# Patient Record
Sex: FEMALE | Race: WHITE | NOT HISPANIC OR LATINO | Employment: FULL TIME | URBAN - METROPOLITAN AREA
[De-identification: names, ages, dates, MRNs, and addresses within clinical notes are randomized per-mention and may not be internally consistent; named-entity substitution may affect disease eponyms.]

---

## 2017-08-02 ENCOUNTER — HISTORICAL (OUTPATIENT)
Dept: LAB | Facility: HOSPITAL | Age: 27
End: 2017-08-02

## 2017-11-01 ENCOUNTER — HISTORICAL (OUTPATIENT)
Dept: LAB | Facility: HOSPITAL | Age: 27
End: 2017-11-01

## 2017-11-01 LAB
ABS NEUT (OLG): 3.62 X10(3)/MCL (ref 2.1–9.2)
ALBUMIN SERPL-MCNC: 4.3 GM/DL (ref 3.4–5)
ALBUMIN/GLOB SERPL: 1.1 RATIO (ref 1.1–2)
ALP SERPL-CCNC: 69 UNIT/L (ref 38–126)
ALT SERPL-CCNC: 23 UNIT/L (ref 12–78)
AST SERPL-CCNC: 14 UNIT/L (ref 15–37)
BASOPHILS # BLD AUTO: 0 X10(3)/MCL (ref 0–0.2)
BASOPHILS NFR BLD AUTO: 0 %
BILIRUB SERPL-MCNC: 0.9 MG/DL (ref 0.2–1)
BILIRUBIN DIRECT+TOT PNL SERPL-MCNC: 0.2 MG/DL (ref 0–0.5)
BILIRUBIN DIRECT+TOT PNL SERPL-MCNC: 0.7 MG/DL (ref 0–0.8)
BUN SERPL-MCNC: 10 MG/DL (ref 7–18)
CALCIUM SERPL-MCNC: 9.4 MG/DL (ref 8.5–10.1)
CHLORIDE SERPL-SCNC: 105 MMOL/L (ref 98–107)
CHOLEST SERPL-MCNC: 139 MG/DL (ref 0–200)
CHOLEST/HDLC SERPL: 2.2 {RATIO} (ref 0–4)
CO2 SERPL-SCNC: 26 MMOL/L (ref 21–32)
CREAT SERPL-MCNC: 0.6 MG/DL (ref 0.55–1.02)
DEPRECATED CALCIDIOL+CALCIFEROL SERPL-MC: 30.35 NG/ML (ref 30–80)
EOSINOPHIL # BLD AUTO: 0.1 X10(3)/MCL (ref 0–0.9)
EOSINOPHIL NFR BLD AUTO: 1 %
ERYTHROCYTE [DISTWIDTH] IN BLOOD BY AUTOMATED COUNT: 12.1 % (ref 11.5–17)
GLOBULIN SER-MCNC: 4 GM/DL (ref 2.4–3.5)
GLUCOSE SERPL-MCNC: 92 MG/DL (ref 74–106)
HCT VFR BLD AUTO: 42.2 % (ref 37–47)
HDLC SERPL-MCNC: 62 MG/DL (ref 35–60)
HGB BLD-MCNC: 14.1 GM/DL (ref 12–16)
LDLC SERPL CALC-MCNC: 70 MG/DL (ref 0–129)
LYMPHOCYTES # BLD AUTO: 2.7 X10(3)/MCL (ref 0.6–4.6)
LYMPHOCYTES NFR BLD AUTO: 39 %
MCH RBC QN AUTO: 29.6 PG (ref 27–31)
MCHC RBC AUTO-ENTMCNC: 33.4 GM/DL (ref 33–36)
MCV RBC AUTO: 88.5 FL (ref 80–94)
MONOCYTES # BLD AUTO: 0.6 X10(3)/MCL (ref 0.1–1.3)
MONOCYTES NFR BLD AUTO: 8 %
NEUTROPHILS # BLD AUTO: 3.62 X10(3)/MCL (ref 2.1–9.2)
NEUTROPHILS NFR BLD AUTO: 52 %
PLATELET # BLD AUTO: 247 X10(3)/MCL (ref 130–400)
PMV BLD AUTO: 10.4 FL (ref 9.4–12.4)
POTASSIUM SERPL-SCNC: 4.3 MMOL/L (ref 3.5–5.1)
PROT SERPL-MCNC: 8.3 GM/DL (ref 6.4–8.2)
RBC # BLD AUTO: 4.77 X10(6)/MCL (ref 4.2–5.4)
SODIUM SERPL-SCNC: 140 MMOL/L (ref 136–145)
TRIGL SERPL-MCNC: 35 MG/DL (ref 30–150)
VLDLC SERPL CALC-MCNC: 7 MG/DL
WBC # SPEC AUTO: 7 X10(3)/MCL (ref 4.5–11.5)

## 2017-11-21 LAB — RAPID GROUP A STREP (OHS): POSITIVE

## 2018-01-15 LAB
INFLUENZA A ANTIGEN, POC: NEGATIVE
INFLUENZA B ANTIGEN, POC: NEGATIVE
RAPID GROUP A STREP (OHS): NEGATIVE

## 2018-02-16 ENCOUNTER — HISTORICAL (OUTPATIENT)
Dept: LAB | Facility: HOSPITAL | Age: 28
End: 2018-02-16

## 2018-02-16 LAB
ABS NEUT (OLG): 4.34 X10(3)/MCL (ref 2.1–9.2)
BASOPHILS # BLD AUTO: 0.03 X10(3)/MCL (ref 0–0.2)
BASOPHILS NFR BLD AUTO: 0.4 % (ref 0–1)
DEPRECATED CALCIDIOL+CALCIFEROL SERPL-MC: 24.24 NG/ML (ref 30–80)
EOSINOPHIL # BLD AUTO: 0.11 X10(3)/MCL (ref 0–0.9)
EOSINOPHIL NFR BLD AUTO: 1.4 % (ref 0–6.4)
ERYTHROCYTE [DISTWIDTH] IN BLOOD BY AUTOMATED COUNT: 12.2 % (ref 11.5–17)
EST. AVERAGE GLUCOSE BLD GHB EST-MCNC: 171 MG/DL
FOLATE SERPL-MCNC: 25.2 NG/ML (ref 3.1–17.5)
HBA1C MFR BLD: 7.6 % (ref 4.2–6.3)
HCT VFR BLD AUTO: 41.3 % (ref 37–47)
HGB BLD-MCNC: 14 GM/DL (ref 12–16)
IMM GRANULOCYTES # BLD AUTO: 0.01 10*3/UL (ref 0–0.02)
IMM GRANULOCYTES NFR BLD AUTO: 0.1 % (ref 0–0.43)
LYMPHOCYTES # BLD AUTO: 2.66 X10(3)/MCL (ref 0.6–4.6)
LYMPHOCYTES NFR BLD AUTO: 34.9 % (ref 16–44)
MCH RBC QN AUTO: 30 PG (ref 27–31)
MCHC RBC AUTO-ENTMCNC: 33.9 GM/DL (ref 33–36)
MCV RBC AUTO: 88.6 FL (ref 80–94)
MONOCYTES # BLD AUTO: 0.47 X10(3)/MCL (ref 0.1–1.3)
MONOCYTES NFR BLD AUTO: 6.2 % (ref 4–12.1)
NEUTROPHILS # BLD AUTO: 4.34 X10(3)/MCL (ref 2.1–9.2)
NEUTROPHILS NFR BLD AUTO: 57 % (ref 43–73)
NRBC BLD AUTO-RTO: 0 % (ref 0–0.2)
PLATELET # BLD AUTO: 227 X10(3)/MCL (ref 130–400)
PMV BLD AUTO: 9.5 FL (ref 7.4–10.4)
RBC # BLD AUTO: 4.66 X10(6)/MCL (ref 4.2–5.4)
T4 FREE SERPL-MCNC: 0.97 NG/DL (ref 0.76–1.46)
TSH SERPL-ACNC: 1.45 MIU/ML (ref 0.36–3.74)
VIT B12 SERPL-MCNC: 745 PG/ML (ref 193–986)
WBC # SPEC AUTO: 7.6 X10(3)/MCL (ref 4.5–11.5)

## 2018-10-29 LAB — POC BETA-HCG (QUAL): NEGATIVE

## 2020-10-14 LAB — POC BETA-HCG (QUAL): NEGATIVE

## 2022-04-10 ENCOUNTER — HISTORICAL (OUTPATIENT)
Dept: ADMINISTRATIVE | Facility: HOSPITAL | Age: 32
End: 2022-04-10

## 2022-04-27 VITALS
HEIGHT: 64 IN | OXYGEN SATURATION: 99 % | DIASTOLIC BLOOD PRESSURE: 72 MMHG | WEIGHT: 148 LBS | BODY MASS INDEX: 25.27 KG/M2 | SYSTOLIC BLOOD PRESSURE: 128 MMHG

## 2022-07-14 ENCOUNTER — OFFICE VISIT (OUTPATIENT)
Dept: URGENT CARE | Facility: CLINIC | Age: 32
End: 2022-07-14
Payer: COMMERCIAL

## 2022-07-14 VITALS
HEIGHT: 67 IN | OXYGEN SATURATION: 98 % | BODY MASS INDEX: 23.54 KG/M2 | WEIGHT: 150 LBS | HEART RATE: 94 BPM | TEMPERATURE: 99 F | DIASTOLIC BLOOD PRESSURE: 91 MMHG | SYSTOLIC BLOOD PRESSURE: 142 MMHG | RESPIRATION RATE: 17 BRPM

## 2022-07-14 DIAGNOSIS — R05.9 COUGH: ICD-10-CM

## 2022-07-14 DIAGNOSIS — U07.1 COVID-19: Primary | ICD-10-CM

## 2022-07-14 LAB
CTP QC/QA: YES
SARS-COV-2 RDRP RESP QL NAA+PROBE: POSITIVE

## 2022-07-14 PROCEDURE — 1160F PR REVIEW ALL MEDS BY PRESCRIBER/CLIN PHARMACIST DOCUMENTED: ICD-10-PCS | Mod: CPTII,,, | Performed by: FAMILY MEDICINE

## 2022-07-14 PROCEDURE — 99213 OFFICE O/P EST LOW 20 MIN: CPT | Mod: ,,, | Performed by: FAMILY MEDICINE

## 2022-07-14 PROCEDURE — 1160F RVW MEDS BY RX/DR IN RCRD: CPT | Mod: CPTII,,, | Performed by: FAMILY MEDICINE

## 2022-07-14 PROCEDURE — U0002 COVID-19 LAB TEST NON-CDC: HCPCS | Mod: QW,,, | Performed by: FAMILY MEDICINE

## 2022-07-14 PROCEDURE — 3008F PR BODY MASS INDEX (BMI) DOCUMENTED: ICD-10-PCS | Mod: CPTII,,, | Performed by: FAMILY MEDICINE

## 2022-07-14 PROCEDURE — 3077F PR MOST RECENT SYSTOLIC BLOOD PRESSURE >= 140 MM HG: ICD-10-PCS | Mod: CPTII,,, | Performed by: FAMILY MEDICINE

## 2022-07-14 PROCEDURE — 3080F DIAST BP >= 90 MM HG: CPT | Mod: CPTII,,, | Performed by: FAMILY MEDICINE

## 2022-07-14 PROCEDURE — 3008F BODY MASS INDEX DOCD: CPT | Mod: CPTII,,, | Performed by: FAMILY MEDICINE

## 2022-07-14 PROCEDURE — 3077F SYST BP >= 140 MM HG: CPT | Mod: CPTII,,, | Performed by: FAMILY MEDICINE

## 2022-07-14 PROCEDURE — 3080F PR MOST RECENT DIASTOLIC BLOOD PRESSURE >= 90 MM HG: ICD-10-PCS | Mod: CPTII,,, | Performed by: FAMILY MEDICINE

## 2022-07-14 PROCEDURE — 1159F PR MEDICATION LIST DOCUMENTED IN MEDICAL RECORD: ICD-10-PCS | Mod: CPTII,,, | Performed by: FAMILY MEDICINE

## 2022-07-14 PROCEDURE — 99213 PR OFFICE/OUTPT VISIT, EST, LEVL III, 20-29 MIN: ICD-10-PCS | Mod: ,,, | Performed by: FAMILY MEDICINE

## 2022-07-14 PROCEDURE — U0002: ICD-10-PCS | Mod: QW,,, | Performed by: FAMILY MEDICINE

## 2022-07-14 PROCEDURE — 1159F MED LIST DOCD IN RCRD: CPT | Mod: CPTII,,, | Performed by: FAMILY MEDICINE

## 2022-07-14 RX ORDER — FLUTICASONE PROPIONATE 50 MCG
1 SPRAY, SUSPENSION (ML) NASAL DAILY
COMMUNITY
Start: 2022-01-19

## 2022-07-14 RX ORDER — CLINDAMYCIN HYDROCHLORIDE 300 MG/1
CAPSULE ORAL
Status: ON HOLD | COMMUNITY
Start: 2022-07-05 | End: 2023-09-22 | Stop reason: HOSPADM

## 2022-07-14 RX ORDER — CLOMIPHENE CITRATE 50 MG/1
TABLET ORAL
Status: ON HOLD | COMMUNITY
Start: 2022-06-20 | End: 2023-09-22 | Stop reason: HOSPADM

## 2022-07-14 RX ORDER — INSULIN GLARGINE 100 [IU]/ML
16 INJECTION, SOLUTION SUBCUTANEOUS
Status: ON HOLD | COMMUNITY
Start: 2021-12-22 | End: 2023-09-22 | Stop reason: HOSPADM

## 2022-07-14 RX ORDER — HYDROXYZINE PAMOATE 25 MG/1
CAPSULE ORAL ONCE AS NEEDED
COMMUNITY
Start: 2021-12-14

## 2022-07-14 RX ORDER — MELOXICAM 7.5 MG/1
7.5 TABLET ORAL NIGHTLY
Status: ON HOLD | COMMUNITY
Start: 2022-06-22 | End: 2023-09-22 | Stop reason: HOSPADM

## 2022-07-14 RX ORDER — INSULIN ASPART 100 [IU]/ML
INJECTION, SOLUTION INTRAVENOUS; SUBCUTANEOUS
Status: ON HOLD | COMMUNITY
Start: 2021-12-22 | End: 2023-09-22 | Stop reason: HOSPADM

## 2022-07-14 RX ORDER — ASPIRIN 325 MG
50000 TABLET, DELAYED RELEASE (ENTERIC COATED) ORAL
Status: ON HOLD | COMMUNITY
Start: 2022-07-05 | End: 2023-09-22 | Stop reason: HOSPADM

## 2022-07-14 RX ORDER — GLUCAGON 3 MG/1
3 POWDER NASAL DAILY PRN
Status: ON HOLD | COMMUNITY
Start: 2021-12-22 | End: 2023-09-22 | Stop reason: HOSPADM

## 2022-07-14 NOTE — PATIENT INSTRUCTIONS
Vitamin D and Zinc.  Force fluids.  Home quarantine for 5 days from symptom onset.  If symptoms have improved and 48 hours fever free can stop home quarantine on day 6 and wear a mask when around others for additional 5 days.  Contagious up to 10 days and fever free. With desire to get pregnant we discussed concerns with use of oral antiviral for COVID.   ER for severe worsening.

## 2022-07-14 NOTE — LETTER
July 14, 2022      [facility] [address] [place] [address]  Phone: [phone]       Patient: Bette Dangelo   YOB: 1990  Date of Visit: 07/14/2022    To Whom It May Concern:    Bette Dangelo  was at [facility] on 07/14/2022. The patient may return to work/school on 7/16/2022 with no restrictions. If you have any questions or concerns, or if I can be of further assistance, please do not hesitate to contact me.    Sincerely,    Ute Mary MD

## 2022-07-14 NOTE — PROGRESS NOTES
"Subjective:       Patient ID: Bette Dangelo is a 31 y.o. female.    Vitals:  height is 5' 7" (1.702 m) and weight is 68 kg (150 lb). Her temperature is 99.3 °F (37.4 °C). Her blood pressure is 142/91 (abnormal) and her pulse is 94. Her respiration is 17 and oxygen saturation is 98%.     Chief Complaint: Sinus Problem (Cough, headache, fatigue since Monday, worse Tuesday, tried taking advil/ cough dops/ nyquil/dayquil)    3 days of cough and congestion, worse yesterday, no SOB, no fever.       Constitution: Negative for chills, fatigue and fever.   HENT: Positive for postnasal drip. Negative for congestion, sinus pressure and trouble swallowing.    Neck: Negative for neck pain and neck stiffness.   Cardiovascular: Negative for chest pain, leg swelling and sob on exertion.   Respiratory: Positive for cough. Negative for chest tightness, shortness of breath and wheezing.    Neurological: Negative for dizziness, disorientation and altered mental status.   Psychiatric/Behavioral: Negative for altered mental status and disorientation.       Objective:      Physical Exam   Constitutional: She is oriented to person, place, and time. She appears well-developed. No distress.   HENT:   Head: Normocephalic.   Ears:   Right Ear: Tympanic membrane and external ear normal.   Left Ear: Tympanic membrane and external ear normal.   Nose: Rhinorrhea present.   Mouth/Throat: Uvula is midline and mucous membranes are normal. No uvula swelling. Cobblestoning present. No oropharyngeal exudate or posterior oropharyngeal edema. Tonsils are 0 on the right. Tonsils are 0 on the left. No tonsillar exudate.   Eyes: Pupils are equal, round, and reactive to light. Right eye exhibits no discharge. Left eye exhibits no discharge.   Neck: Neck supple. No tracheal deviation present.   Cardiovascular: Normal rate, regular rhythm and normal heart sounds.   No murmur heard.  Pulmonary/Chest: Effort normal and breath sounds normal. No stridor. No " respiratory distress. She has no wheezes.   Lymphadenopathy:     She has no cervical adenopathy.   Neurological: She is alert and oriented to person, place, and time.   Skin: Skin is warm and dry.   Psychiatric: Mood and thought content normal.   Nursing note and vitals reviewed.        Assessment:       1. COVID-19    2. Cough          Plan:         COVID-19    Cough  -     POCT COVID-19 Rapid Screening             COVID test positive.

## 2022-09-21 ENCOUNTER — HISTORICAL (OUTPATIENT)
Dept: ADMINISTRATIVE | Facility: HOSPITAL | Age: 32
End: 2022-09-21
Payer: MEDICAID

## 2023-02-07 ENCOUNTER — OFFICE VISIT (OUTPATIENT)
Dept: URGENT CARE | Facility: CLINIC | Age: 33
End: 2023-02-07
Payer: COMMERCIAL

## 2023-02-07 VITALS
WEIGHT: 150 LBS | SYSTOLIC BLOOD PRESSURE: 130 MMHG | DIASTOLIC BLOOD PRESSURE: 90 MMHG | RESPIRATION RATE: 20 BRPM | HEIGHT: 67 IN | TEMPERATURE: 99 F | OXYGEN SATURATION: 99 % | BODY MASS INDEX: 23.54 KG/M2 | HEART RATE: 80 BPM

## 2023-02-07 DIAGNOSIS — R09.81 CONGESTION OF NASAL SINUS: ICD-10-CM

## 2023-02-07 DIAGNOSIS — J00 NASOPHARYNGITIS: Primary | ICD-10-CM

## 2023-02-07 LAB
CTP QC/QA: YES
MOLECULAR STREP A: NEGATIVE
POC MOLECULAR INFLUENZA A AGN: NEGATIVE
POC MOLECULAR INFLUENZA B AGN: NEGATIVE
SARS-COV-2 RDRP RESP QL NAA+PROBE: NEGATIVE

## 2023-02-07 PROCEDURE — 87502 POCT INFLUENZA A/B MOLECULAR: ICD-10-PCS | Mod: QW,,, | Performed by: FAMILY MEDICINE

## 2023-02-07 PROCEDURE — 87502 INFLUENZA DNA AMP PROBE: CPT | Mod: QW,,, | Performed by: FAMILY MEDICINE

## 2023-02-07 PROCEDURE — 3008F BODY MASS INDEX DOCD: CPT | Mod: CPTII,,, | Performed by: FAMILY MEDICINE

## 2023-02-07 PROCEDURE — 87635: ICD-10-PCS | Mod: QW,,, | Performed by: FAMILY MEDICINE

## 2023-02-07 PROCEDURE — 1159F MED LIST DOCD IN RCRD: CPT | Mod: CPTII,,, | Performed by: FAMILY MEDICINE

## 2023-02-07 PROCEDURE — 1160F RVW MEDS BY RX/DR IN RCRD: CPT | Mod: CPTII,,, | Performed by: FAMILY MEDICINE

## 2023-02-07 PROCEDURE — 1159F PR MEDICATION LIST DOCUMENTED IN MEDICAL RECORD: ICD-10-PCS | Mod: CPTII,,, | Performed by: FAMILY MEDICINE

## 2023-02-07 PROCEDURE — 99213 PR OFFICE/OUTPT VISIT, EST, LEVL III, 20-29 MIN: ICD-10-PCS | Mod: ,,, | Performed by: FAMILY MEDICINE

## 2023-02-07 PROCEDURE — 3008F PR BODY MASS INDEX (BMI) DOCUMENTED: ICD-10-PCS | Mod: CPTII,,, | Performed by: FAMILY MEDICINE

## 2023-02-07 PROCEDURE — 1160F PR REVIEW ALL MEDS BY PRESCRIBER/CLIN PHARMACIST DOCUMENTED: ICD-10-PCS | Mod: CPTII,,, | Performed by: FAMILY MEDICINE

## 2023-02-07 PROCEDURE — 87651 STREP A DNA AMP PROBE: CPT | Mod: QW,,, | Performed by: FAMILY MEDICINE

## 2023-02-07 PROCEDURE — 3075F SYST BP GE 130 - 139MM HG: CPT | Mod: CPTII,,, | Performed by: FAMILY MEDICINE

## 2023-02-07 PROCEDURE — 3080F DIAST BP >= 90 MM HG: CPT | Mod: CPTII,,, | Performed by: FAMILY MEDICINE

## 2023-02-07 PROCEDURE — 87651 POCT STREP A MOLECULAR: ICD-10-PCS | Mod: QW,,, | Performed by: FAMILY MEDICINE

## 2023-02-07 PROCEDURE — 87635 SARS-COV-2 COVID-19 AMP PRB: CPT | Mod: QW,,, | Performed by: FAMILY MEDICINE

## 2023-02-07 PROCEDURE — 3075F PR MOST RECENT SYSTOLIC BLOOD PRESS GE 130-139MM HG: ICD-10-PCS | Mod: CPTII,,, | Performed by: FAMILY MEDICINE

## 2023-02-07 PROCEDURE — 99213 OFFICE O/P EST LOW 20 MIN: CPT | Mod: ,,, | Performed by: FAMILY MEDICINE

## 2023-02-07 PROCEDURE — 3080F PR MOST RECENT DIASTOLIC BLOOD PRESSURE >= 90 MM HG: ICD-10-PCS | Mod: CPTII,,, | Performed by: FAMILY MEDICINE

## 2023-02-07 RX ORDER — ASPIRIN 325 MG
50000 TABLET, DELAYED RELEASE (ENTERIC COATED) ORAL
Status: ON HOLD | COMMUNITY
Start: 2022-05-02 | End: 2023-09-22 | Stop reason: HOSPADM

## 2023-02-07 RX ORDER — MELOXICAM 7.5 MG/1
1 TABLET ORAL NIGHTLY
Status: ON HOLD | COMMUNITY
Start: 2022-06-22 | End: 2023-09-22 | Stop reason: HOSPADM

## 2023-02-07 RX ORDER — DEXAMETHASONE 4 MG/1
2 TABLET ORAL 2 TIMES DAILY
COMMUNITY
Start: 2022-08-30

## 2023-02-07 RX ORDER — ONDANSETRON HYDROCHLORIDE 8 MG/1
8 TABLET, FILM COATED ORAL EVERY 8 HOURS PRN
Status: ON HOLD | COMMUNITY
Start: 2022-11-04 | End: 2023-09-22 | Stop reason: HOSPADM

## 2023-02-07 RX ORDER — INSULIN ASPART 100 [IU]/ML
INJECTION, SOLUTION INTRAVENOUS; SUBCUTANEOUS
Status: ON HOLD | COMMUNITY
End: 2023-09-22 | Stop reason: HOSPADM

## 2023-02-07 NOTE — PATIENT INSTRUCTIONS
Flonase and saline nasal spray twice a day, antihistamine at bedtime.  Force fluids.   Cough may linger a few weeks but should not have fever, chest pain, or shortness of breath. If sinus pressure remains can call for consideration of antibiotic.

## 2023-02-07 NOTE — PROGRESS NOTES
"Subjective:       Patient ID: Bette Dangelo is a 32 y.o. female.    Vitals:  height is 5' 7" (1.702 m) and weight is 68 kg (150 lb). Her temperature is 98.6 °F (37 °C). Her blood pressure is 130/90 (abnormal) and her pulse is 80. Her respiration is 20 and oxygen saturation is 99%.     Chief Complaint: Sinus Problem (Congestion, HA, cough started 02/06 rt ear pain, has TMJ, scratchy throat, been around coworker. )    1 day of cough, congestion, and otitis.  No fever. Exposed to an illness but unsure of what it is.       Constitution: Negative for chills, fatigue and fever.   HENT:  Positive for ear pain and postnasal drip. Negative for congestion, sinus pressure and trouble swallowing.    Neck: Negative for neck pain and neck stiffness.   Cardiovascular:  Negative for chest pain, leg swelling and sob on exertion.   Respiratory:  Positive for cough. Negative for chest tightness, shortness of breath and wheezing.    Neurological:  Negative for dizziness, disorientation and altered mental status.   Psychiatric/Behavioral:  Negative for altered mental status and disorientation.      Objective:      Physical Exam   Constitutional: She is oriented to person, place, and time. She appears well-developed. No distress.   HENT:   Head: Normocephalic.   Ears:   Right Ear: Tympanic membrane and external ear normal.   Left Ear: Tympanic membrane and external ear normal.   Nose: Rhinorrhea present.   Mouth/Throat: Uvula is midline and mucous membranes are normal. No uvula swelling. Cobblestoning present. No oropharyngeal exudate or posterior oropharyngeal edema. Tonsils are 0 on the right. Tonsils are 0 on the left. No tonsillar exudate.   Eyes: Pupils are equal, round, and reactive to light. Right eye exhibits no discharge. Left eye exhibits no discharge.   Neck: Neck supple. No tracheal deviation present.   Cardiovascular: Normal rate, regular rhythm and normal heart sounds.   No murmur heard.  Pulmonary/Chest: Effort normal and " breath sounds normal. No stridor. No respiratory distress. She has no wheezes.   Abdominal: Normal appearance.   Lymphadenopathy:     She has no cervical adenopathy.   Neurological: no focal deficit. She is alert and oriented to person, place, and time.   Skin: Skin is warm and dry. Capillary refill takes less than 2 seconds.   Psychiatric: Mood and thought content normal.   Nursing note and vitals reviewed.      Assessment:       1. Nasopharyngitis    2. Congestion of nasal sinus            Plan:         Nasopharyngitis    Congestion of nasal sinus  -     POCT COVID-19 Rapid Screening  -     POCT Influenza A/B MOLECULAR  -     POCT Strep A, Molecular               COVID, Flu and strep negative.

## 2023-07-01 ENCOUNTER — OFFICE VISIT (OUTPATIENT)
Dept: URGENT CARE | Facility: CLINIC | Age: 33
End: 2023-07-01
Payer: COMMERCIAL

## 2023-07-01 VITALS
OXYGEN SATURATION: 98 % | HEIGHT: 67 IN | RESPIRATION RATE: 18 BRPM | WEIGHT: 150 LBS | BODY MASS INDEX: 23.54 KG/M2 | DIASTOLIC BLOOD PRESSURE: 92 MMHG | TEMPERATURE: 98 F | HEART RATE: 91 BPM | SYSTOLIC BLOOD PRESSURE: 134 MMHG

## 2023-07-01 DIAGNOSIS — R35.0 FREQUENT URINATION: ICD-10-CM

## 2023-07-01 DIAGNOSIS — R10.11 RUQ PAIN: Primary | ICD-10-CM

## 2023-07-01 PROBLEM — E55.9 VITAMIN D DEFICIENCY: Status: ACTIVE | Noted: 2020-04-29

## 2023-07-01 PROBLEM — I10 PRIMARY HYPERTENSION: Status: ACTIVE | Noted: 2023-07-01

## 2023-07-01 LAB
BILIRUB UR QL STRIP: POSITIVE
GLUCOSE UR QL STRIP: POSITIVE
KETONES UR QL STRIP: NEGATIVE
LEUKOCYTE ESTERASE UR QL STRIP: NEGATIVE
PH, POC UA: 5
POC BLOOD, URINE: NEGATIVE
POC NITRATES, URINE: NEGATIVE
PROT UR QL STRIP: POSITIVE
SP GR UR STRIP: 1.03 (ref 1–1.03)
UROBILINOGEN UR STRIP-ACNC: POSITIVE (ref 0.1–1.1)

## 2023-07-01 PROCEDURE — 81003 URINALYSIS AUTO W/O SCOPE: CPT | Mod: QW,,, | Performed by: FAMILY MEDICINE

## 2023-07-01 PROCEDURE — 99214 OFFICE O/P EST MOD 30 MIN: CPT | Mod: ,,, | Performed by: FAMILY MEDICINE

## 2023-07-01 PROCEDURE — 81003 POCT URINALYSIS, DIPSTICK, MANUAL, W/O SCOPE: ICD-10-PCS | Mod: QW,,, | Performed by: FAMILY MEDICINE

## 2023-07-01 PROCEDURE — 99214 PR OFFICE/OUTPT VISIT, EST, LEVL IV, 30-39 MIN: ICD-10-PCS | Mod: ,,, | Performed by: FAMILY MEDICINE

## 2023-07-01 RX ORDER — KETOROLAC TROMETHAMINE 10 MG/1
10 TABLET, FILM COATED ORAL EVERY 6 HOURS
Qty: 20 TABLET | Refills: 0 | Status: SHIPPED | OUTPATIENT
Start: 2023-07-01 | End: 2023-07-06

## 2023-07-01 RX ORDER — DICYCLOMINE HYDROCHLORIDE 10 MG/1
10 CAPSULE ORAL
Qty: 120 CAPSULE | Refills: 0 | Status: SHIPPED | OUTPATIENT
Start: 2023-07-01 | End: 2023-07-31

## 2023-07-01 NOTE — PROGRESS NOTES
"Subjective:      Patient ID: Bette Dangelo is a 32 y.o. female.    Vitals:  height is 5' 7" (1.702 m) and weight is 68 kg (150 lb). Her temperature is 98.3 °F (36.8 °C). Her blood pressure is 134/92 (abnormal) and her pulse is 91. Her respiration is 18 and oxygen saturation is 98%.     Chief Complaint: OTHER (Having lower abdominal pain, more than normal vaginal discharge clear, had what felt like bladder spasms yesterday. Started Thursday.)    3 days of lower abdominal pain with clear vaginal drainage and yesterday bladder spasm sensation. No fever. No flank pain.  Hx of DM.  LMP 2 weeks ago.       Constitution: Negative for chills, sweating and fever.   HENT:  Negative for sinus pressure.    Respiratory:  Negative for cough.    Genitourinary:  Positive for frequency and vaginal discharge. Negative for urgency, flank pain and pelvic pain.   Skin:  Negative for rash.   Neurological:  Negative for loss of consciousness.    Objective:     Physical Exam   Constitutional: She is oriented to person, place, and time. She appears well-developed.   HENT:   Head: Normocephalic and atraumatic.   Mouth/Throat: Mucous membranes are normal.   Eyes: Lids are normal.   Neck: Trachea normal. Neck supple.   Cardiovascular: Normal rate, regular rhythm and normal heart sounds.   Pulmonary/Chest: Effort normal and breath sounds normal. No respiratory distress.   Abdominal: Normal appearance. She exhibits no abdominal bruit, no pulsatile midline mass and no mass. Soft. There is abdominal tenderness (RUQ TTP).   Musculoskeletal: Normal range of motion.         General: Normal range of motion.   Neurological: no focal deficit. She is alert and oriented to person, place, and time. She has normal strength.   Skin: Skin is warm, dry, intact, not diaphoretic and not pale.   Psychiatric: Her speech is normal and behavior is normal. Judgment and thought content normal.   Nursing note and vitals reviewed.    Assessment:     1. RUQ pain    2. " Frequent urination        Plan:       RUQ pain  -     US Abdomen Limited; Future; Expected date: 07/01/2023  -     dicyclomine (BENTYL) 10 MG capsule; Take 1 capsule (10 mg total) by mouth 4 (four) times daily before meals and nightly.  Dispense: 120 capsule; Refill: 0  -     ketorolac (TORADOL) 10 mg tablet; Take 1 tablet (10 mg total) by mouth every 6 (six) hours. for 5 days  Dispense: 20 tablet; Refill: 0    Frequent urination  -     POCT Urinalysis, Dipstick, Manual, w/o Scope             Component Ref Range & Units 14:06   POC Blood, Urine Negative Negative    POC Bilirubin, Urine Negative Positive Abnormal     Comment: 1   POC Urobilinogen, Urine 0.1 - 1.1 Positive    Comment: 2   POC Ketones, Urine Negative Negative    POC Protein, Urine Negative Positive Abnormal     Comment: 30   POC Nitrates, Urine Negative Negative    POC Glucose, Urine Negative Positive Abnormal     Comment: 50   pH, UA  5    POC Specific Gravity, Urine 1.003 - 1.029 1.030 Abnormal     POC Leukocytes, Urine Negative Negative    Resulting Agency  [facility]

## 2023-07-01 NOTE — PATIENT INSTRUCTIONS
Concern for gallbladder flair up.  Start with lots of fluids, bland diet, Toradol with food.  Can also try bentyl and warm compress.  Will plan for ultrasound to further evaluate.  ER precautions for worsening pain or fever.

## 2023-09-15 ENCOUNTER — HOSPITAL ENCOUNTER (EMERGENCY)
Facility: HOSPITAL | Age: 33
Discharge: HOME | End: 2023-09-15
Attending: STUDENT IN AN ORGANIZED HEALTH CARE EDUCATION/TRAINING PROGRAM
Payer: COMMERCIAL

## 2023-09-15 VITALS
TEMPERATURE: 98 F | SYSTOLIC BLOOD PRESSURE: 146 MMHG | RESPIRATION RATE: 22 BRPM | DIASTOLIC BLOOD PRESSURE: 96 MMHG | OXYGEN SATURATION: 99 % | HEART RATE: 102 BPM

## 2023-09-15 DIAGNOSIS — R10.13 EPIGASTRIC PAIN: Primary | ICD-10-CM

## 2023-09-15 LAB
ALBUMIN SERPL-MCNC: 4.7 G/DL (ref 3.5–5)
ALBUMIN/GLOB SERPL: 1.3 RATIO (ref 1.1–2)
ALP SERPL-CCNC: 57 UNIT/L (ref 40–150)
ALT SERPL-CCNC: 10 UNIT/L (ref 0–55)
AMPHET UR QL SCN: NEGATIVE
APAP SERPL-MCNC: <17.4 UG/ML (ref 17.4–30)
APPEARANCE UR: ABNORMAL
AST SERPL-CCNC: 14 UNIT/L (ref 5–34)
B-HCG SERPL QL: NEGATIVE
BACTERIA #/AREA URNS AUTO: ABNORMAL /HPF
BARBITURATE SCN PRESENT UR: NEGATIVE
BASOPHILS # BLD AUTO: 0.02 X10(3)/MCL
BASOPHILS NFR BLD AUTO: 0.3 %
BENZODIAZ UR QL SCN: NEGATIVE
BILIRUB SERPL-MCNC: 1.1 MG/DL
BILIRUB UR QL STRIP.AUTO: NEGATIVE
BUN SERPL-MCNC: 14.2 MG/DL (ref 7–18.7)
CALCIUM SERPL-MCNC: 9.8 MG/DL (ref 8.4–10.2)
CANNABINOIDS UR QL SCN: NEGATIVE
CHLORIDE SERPL-SCNC: 102 MMOL/L (ref 98–107)
CO2 SERPL-SCNC: 26 MMOL/L (ref 22–29)
COCAINE UR QL SCN: NEGATIVE
COLOR UR: ABNORMAL
CREAT SERPL-MCNC: 0.95 MG/DL (ref 0.55–1.02)
EOSINOPHIL # BLD AUTO: 0.01 X10(3)/MCL (ref 0–0.9)
EOSINOPHIL NFR BLD AUTO: 0.1 %
ERYTHROCYTE [DISTWIDTH] IN BLOOD BY AUTOMATED COUNT: 11.9 % (ref 11.5–17)
ETHANOL SERPL-MCNC: <10 MG/DL
FENTANYL UR QL SCN: NEGATIVE
GFR SERPLBLD CREATININE-BSD FMLA CKD-EPI: >60 MLS/MIN/1.73/M2
GLOBULIN SER-MCNC: 3.7 GM/DL (ref 2.4–3.5)
GLUCOSE SERPL-MCNC: 166 MG/DL (ref 74–100)
GLUCOSE UR QL STRIP.AUTO: ABNORMAL
HCT VFR BLD AUTO: 41.6 % (ref 37–47)
HGB BLD-MCNC: 14.3 G/DL (ref 12–16)
IMM GRANULOCYTES # BLD AUTO: 0.02 X10(3)/MCL (ref 0–0.04)
IMM GRANULOCYTES NFR BLD AUTO: 0.3 %
KETONES UR QL STRIP.AUTO: ABNORMAL
LEUKOCYTE ESTERASE UR QL STRIP.AUTO: NEGATIVE
LIPASE SERPL-CCNC: 10 U/L
LYMPHOCYTES # BLD AUTO: 2.36 X10(3)/MCL (ref 0.6–4.6)
LYMPHOCYTES NFR BLD AUTO: 34.4 %
MCH RBC QN AUTO: 30 PG (ref 27–31)
MCHC RBC AUTO-ENTMCNC: 34.4 G/DL (ref 33–36)
MCV RBC AUTO: 87.2 FL (ref 80–94)
MDMA UR QL SCN: NEGATIVE
MONOCYTES # BLD AUTO: 0.55 X10(3)/MCL (ref 0.1–1.3)
MONOCYTES NFR BLD AUTO: 8 %
NEUTROPHILS # BLD AUTO: 3.9 X10(3)/MCL (ref 2.1–9.2)
NEUTROPHILS NFR BLD AUTO: 56.9 %
NITRITE UR QL STRIP.AUTO: NEGATIVE
NRBC BLD AUTO-RTO: 0 %
OPIATES UR QL SCN: NEGATIVE
PCP UR QL: NEGATIVE
PH UR STRIP.AUTO: 6 [PH]
PH UR: 6 [PH] (ref 3–11)
PLATELET # BLD AUTO: 343 X10(3)/MCL (ref 130–400)
PMV BLD AUTO: 9.8 FL (ref 7.4–10.4)
POTASSIUM SERPL-SCNC: 3.8 MMOL/L (ref 3.5–5.1)
PROT SERPL-MCNC: 8.4 GM/DL (ref 6.4–8.3)
PROT UR QL STRIP.AUTO: ABNORMAL
RBC # BLD AUTO: 4.77 X10(6)/MCL (ref 4.2–5.4)
RBC #/AREA URNS AUTO: <5 /HPF
RBC UR QL AUTO: ABNORMAL
SALICYLATES SERPL-MCNC: <5 MG/DL (ref 15–30)
SODIUM SERPL-SCNC: 140 MMOL/L (ref 136–145)
SP GR UR STRIP.AUTO: 1.03 (ref 1–1.03)
SQUAMOUS #/AREA URNS AUTO: 11 /HPF
TSH SERPL-ACNC: 1.12 UIU/ML (ref 0.35–4.94)
UROBILINOGEN UR STRIP-ACNC: 1
WBC # SPEC AUTO: 6.86 X10(3)/MCL (ref 4.5–11.5)
WBC #/AREA URNS AUTO: 7 /HPF

## 2023-09-15 PROCEDURE — 84443 ASSAY THYROID STIM HORMONE: CPT | Performed by: NURSE PRACTITIONER

## 2023-09-15 PROCEDURE — 85025 COMPLETE CBC W/AUTO DIFF WBC: CPT | Performed by: NURSE PRACTITIONER

## 2023-09-15 PROCEDURE — 80179 DRUG ASSAY SALICYLATE: CPT | Performed by: NURSE PRACTITIONER

## 2023-09-15 PROCEDURE — 80307 DRUG TEST PRSMV CHEM ANLYZR: CPT | Performed by: NURSE PRACTITIONER

## 2023-09-15 PROCEDURE — 82077 ASSAY SPEC XCP UR&BREATH IA: CPT | Performed by: NURSE PRACTITIONER

## 2023-09-15 PROCEDURE — 80053 COMPREHEN METABOLIC PANEL: CPT | Performed by: NURSE PRACTITIONER

## 2023-09-15 PROCEDURE — 83690 ASSAY OF LIPASE: CPT | Performed by: NURSE PRACTITIONER

## 2023-09-15 PROCEDURE — 81001 URINALYSIS AUTO W/SCOPE: CPT | Mod: 59 | Performed by: NURSE PRACTITIONER

## 2023-09-15 PROCEDURE — 80143 DRUG ASSAY ACETAMINOPHEN: CPT | Performed by: NURSE PRACTITIONER

## 2023-09-15 PROCEDURE — 81025 URINE PREGNANCY TEST: CPT | Performed by: NURSE PRACTITIONER

## 2023-09-15 PROCEDURE — 99284 EMERGENCY DEPT VISIT MOD MDM: CPT | Mod: 25

## 2023-09-15 RX ORDER — ONDANSETRON 4 MG/1
4 TABLET, ORALLY DISINTEGRATING ORAL EVERY 6 HOURS PRN
Qty: 12 TABLET | Refills: 0 | Status: ON HOLD | OUTPATIENT
Start: 2023-09-15 | End: 2023-09-22 | Stop reason: HOSPADM

## 2023-09-15 RX ORDER — FAMOTIDINE 20 MG/1
20 TABLET, FILM COATED ORAL 2 TIMES DAILY
Qty: 20 TABLET | Refills: 0 | Status: SHIPPED | OUTPATIENT
Start: 2023-09-15 | End: 2024-09-14

## 2023-09-15 NOTE — ED PROVIDER NOTES
"Encounter Date: 9/15/2023    SCRIBE #1 NOTE: I, Gael Solomon, am scribing for, and in the presence of,  Dr. Blunt. I have scribed the following portions of the note - Other sections scribed: HPI, ROS, Physical Exam, MDM, Attending.       History     Chief Complaint   Patient presents with    medical clearance     Sent here by [facility] for medical clearance. Has a hx of gallbladder stone and scheduled for sx on 9/21. Pt c/o generalized abdominal pain, diarrhea intermittent, Denies N/V. "Been depressed" for the past 3 days with inability to focus, denies SI/HI. +Flat affect      32 y/o female with history of DM and gall stones presents to ED for upper abdominal pain.  Pt was sent by [facility] for medical clearance for voluntary admission there.  She has cholecystectomy scheduled next week.  Pt says she "is just tired" and complains of tremors.  She denies fever, chills, nausea or vomiting. She denies nausea or vomiting.    The history is provided by the patient.     Review of patient's allergies indicates:  No Known Allergies  Past Medical History:   Diagnosis Date    Diabetes mellitus type I     TMJ (dislocation of temporomandibular joint)     Vitamin D deficiency      Past Surgical History:   Procedure Laterality Date    BACK SURGERY      1999     Family History   Problem Relation Age of Onset    Mitral valve prolapse Mother     Other Father         mental concerns    Other Sister         heart issues    No Known Problems Brother     Breast cancer Maternal Grandmother      Social History     Tobacco Use    Smoking status: Never    Smokeless tobacco: Never   Substance Use Topics    Alcohol use: Yes     Comment: Occasionally    Drug use: Never     Review of Systems   Constitutional:  Negative for chills and fever.   Gastrointestinal:  Positive for abdominal pain. Negative for nausea and vomiting.   Neurological:  Positive for tremors.       Physical Exam     Initial Vitals [09/15/23 1327]   BP Pulse Resp Temp " SpO2   (!) 163/112 (!) 112 17 98.3 °F (36.8 °C) 98 %      MAP       --         Physical Exam    Nursing note and vitals reviewed.  Constitutional: She appears well-developed and well-nourished. She is not diaphoretic. No distress.   HENT:   Head: Normocephalic and atraumatic.   Nose: Nose normal.   Mouth/Throat: Oropharynx is clear and moist.   Eyes: EOM are normal. Pupils are equal, round, and reactive to light.   Neck: Neck supple.   Normal range of motion.  Cardiovascular:  Normal rate and regular rhythm.           No murmur heard.  Pulmonary/Chest: Breath sounds normal. No respiratory distress. She has no wheezes. She has no rales.   Abdominal: Abdomen is soft. She exhibits no distension. There is abdominal tenderness (epigastric/RUQ). There is no rebound and no guarding.   Musculoskeletal:      Cervical back: Normal range of motion and neck supple.     Neurological: She is alert and oriented to person, place, and time. She has normal strength. No cranial nerve deficit or sensory deficit.   Skin: Skin is warm. Capillary refill takes less than 2 seconds. No rash noted.   Psychiatric:   Flat, blunt affect         ED Course   Procedures  Labs Reviewed   COMPREHENSIVE METABOLIC PANEL - Abnormal; Notable for the following components:       Result Value    Glucose Level 166 (*)     Protein Total 8.4 (*)     Globulin 3.7 (*)     All other components within normal limits   ACETAMINOPHEN LEVEL - Abnormal; Notable for the following components:    Acetaminophen Level <17.4 (*)     All other components within normal limits   SALICYLATE LEVEL - Abnormal; Notable for the following components:    Salicylate Level <5.0 (*)     All other components within normal limits   URINALYSIS, REFLEX TO URINE CULTURE - Abnormal; Notable for the following components:    Appearance, UA Cloudy (*)     Protein, UA Trace (*)     Glucose, UA Trace (*)     Ketones, UA 3+ (*)     Blood, UA 2+ (*)     All other components within normal limits    URINALYSIS, MICROSCOPIC - Abnormal; Notable for the following components:    WBC, UA 7 (*)     Squamous Epithelial Cells, UA 11 (*)     Bacteria, UA 3+ (*)     All other components within normal limits   DRUG SCREEN, URINE (BEAKER) - Normal    Narrative:     Cut off concentrations:    Amphetamines - 1000 ng/ml  Barbiturates - 200 ng/ml  Benzodiazepine - 200 ng/ml  Cannabinoids (THC) - 50 ng/ml  Cocaine - 300 ng/ml  Fentanyl - 1.0 ng/ml  MDMA - 500 ng/ml  Opiates - 300 ng/ml   Phencyclidine (PCP) - 25 ng/ml    Specimen submitted for drug analysis and tested for pH and specific gravity in order to evaluate sample integrity. Suspect tampering if specific gravity is <1.003 and/or pH is not within the range of 4.5 - 8.0  False negatives may result form substances such as bleach added to urine.  False positives may result for the presence of a substance with similar chemical structure to the drug or its metabolite.    This test provides only a PRELIMINARY analytical test result. A more specific alternate chemical method must be used in order to obtain a confirmed analytical result. Gas chromatography/mass spectrometry (GC/MS) is the preferred confirmatory method. Other chemical confirmation methods are available. Clinical consideration and professional judgement should be applied to any drug of abuse test result, particularly when preliminary positive results are used.    Positive results will be confirmed only at the physicians request. Unconfirmed screening results are to be used only for medical purposes (treatment).        HCG QUALITATIVE URINE - Normal   ALCOHOL,MEDICAL (ETHANOL) - Normal   TSH - Normal   LIPASE - Normal   CBC W/ AUTO DIFFERENTIAL    Narrative:     The following orders were created for panel order CBC Auto Differential.  Procedure                               Abnormality         Status                     ---------                               -----------         ------                     CBC  with Differential[[id number]]                           Final result                 Please view results for these tests on the individual orders.   CBC WITH DIFFERENTIAL          Imaging Results              US Abdomen Limited (Final result)  Result time 09/15/23 15:44:55      Final result by Shabbir Abbott MD (09/15/23 15:44:55)                   Impression:      Cholelithiasis.  No biliary dilatation or sonographic findings for cholecystitis.      Electronically signed by: Shabbir Abbott  Date:    09/15/2023  Time:    15:44               Narrative:    EXAMINATION:  US ABDOMEN LIMITED    CLINICAL HISTORY:  ruq pain, nausea, hx cholelithiasis;    TECHNIQUE:  Gray-scale and color Doppler ultrasound images of the abdomen.    COMPARISON:  Ultrasound 07/05/2023    FINDINGS:  Pancreas partially obscured with no abnormality identified as imaged.  Normal caliber of the visualized IVC.    Liver has normal size and echogenicity.  Main portal vein patent with normal flow direction.    Several gallstones.  Largest measures about 14 mm.  No gallbladder wall thickening or ascites.  Negative sonographic Madden sign.  Normal CBD caliber.    No hydronephrosis or defined calcification in the right kidney.    Measurements:    - Liver: 13.1 cm    - CBD diameter: 2 mm    - Right kidney: 10.4 cm in length                                       Medications - No data to display  Medical Decision Making  Amount and/or Complexity of Data Reviewed  Labs:  Decision-making details documented in ED Course.  Radiology: ordered.    Risk  Prescription drug management.    Differential diagnosis (includes but is not limited to):     Cholecystitis, cholelithiasis, biliary pathology, dehydration, kidney injury, electrolyte abnormalities,  SI, HI, AVH    MDM Narrative   33-year-old female presents for medical clearance from [place].  She was trying to check herself in voluntarily but they referred her here due to her history of cholelithiasis and  scheduled surgery on 09/21.  She denies any current complaints aside from some dull abdominal pain that is improved over the past couple of days.  She denies any nausea or vomiting.  Labs are pending for medical clearance.  Given her history, will proceed with an ultrasound for further evaluation of the patient's symptoms.  Patient is tolerating oral intake currently.  She is drinking fluids.    Update:   Labs reviewed.  Anion gap normal.  Kidney function within normal limits.  Urinalysis with several squames, suspect dirty catch.   Ultrasound reviewed, shows cholelithiasis with no evidence of cholecystitis. Patient continues to deny any complaints.  Patient is drinking fluids.  Patient is tolerating oral intake well.  Patient is medically cleared for admission to [facility].    Dispo:   Discharge    My independent radiology interpretation:  as above  Point of care US (independently performed and interpreted):   Decision rules/clinical scoring:     Sepsis Perfusion Assessment:     Amount and/or Complexity of Data Reviewed  Independent historian: none   Summary of history:   External data reviewed: notes from previous admissions, notes from previous ED visits, and notes from clinic visits  Summary of data reviewed:   Prior records reviewed  Risk and benefits of testing: discussed   Labs: ordered and reviewed  Radiology: ordered and independent interpretation performed (see above or ED course)  ECG/medicine tests: ordered and independent interpretation performed (see above or ED course)  Discussion of management or test interpretation with external provider(s): none   Summary of discussion:     Risk  Decision regarding transfer   Diagnosis or treatment significantly impacted by social determinants of health: psychiatric illness  Shared decision making   Prescription medications    Critical Care  none    Data Reviewed/Counseling: I have personally reviewed the patient's vital signs, nursing notes, and other relevant  tests, information, and imaging. I had a detailed discussion regarding the historical points, exam findings, and any diagnostic results supporting the discharge diagnosis. I personally performed the history, PE, MDM and procedures as documented above and agree with the scribe's documentation.    Portions of this note were dictated using voice recognition software. Although it was reviewed for accuracy, some inherent voice recognition errors may have occurred and may be present in this document.          Scribe Attestation:   Scribe #1: I performed the above scribed service and the documentation accurately describes the services I performed. I attest to the accuracy of the note.    Attending Attestation:           Physician Attestation for Scribe:  Physician Attestation Statement for Scribe #1: I, reviewed documentation, as scribed by Gael Solomon in my presence, and it is both accurate and complete.             ED Course as of 09/19/23 1546   Fri Sep 15, 2023   1640 CO2: 26 [MC]   1640 Creatinine: 0.95 [MC]   1643 Squam Epithel, UA(!): 11  Suspect contaminated collection, no dysuria or other  symptoms. [MC]      ED Course User Index  [MC] Oswald Blunt MD                    Clinical Impression:   Final diagnoses:  [R10.13] Epigastric pain (Primary)        ED Disposition Condition    Discharge Stable          ED Prescriptions       Medication Sig Dispense Start Date End Date Auth. Provider    famotidine (PEPCID) 20 MG tablet Take 1 tablet (20 mg total) by mouth 2 (two) times daily. 20 tablet 9/15/2023 9/14/2024 Oswald Blunt MD    ondansetron (ZOFRAN-ODT) 4 MG TbDL Take 1 tablet (4 mg total) by mouth every 6 (six) hours as needed (Nausea). 12 tablet 9/15/2023 -- Oswald Blunt MD          Follow-up Information       Follow up With Specialties Details Why Contact Info    Sunshine Calderon, FNP Family Medicine In 2 days  [address] [place] [address] [place] [id number] [phone]       [facility] Go today  [address] [facility] [address]  [phone]      [facility] - Emergency Dept Emergency Medicine  If symptoms worsen [address] [facility] [id number] [phone]             Oswald Blunt MD  09/19/23 8067

## 2023-09-15 NOTE — FIRST PROVIDER EVALUATION
Medical screening examination initiated.  I have conducted a focused provider triage encounter, findings are as follows:    Brief history of present illness:  Patient states that she needs medical clearance to be admitted to [facility]. States that she does have a gallbladder procedure scheduled.  States generalized abdominal pain. Denies any nausea or vomiting.     There were no vitals filed for this visit.    Pertinent physical exam:  Awake, alert, ambulatory    Brief workup plan:  Labs    Preliminary workup initiated; this workup will be continued and followed by the physician or advanced practice provider that is assigned to the patient when roomed.

## 2023-09-15 NOTE — DISCHARGE INSTRUCTIONS

## 2023-09-16 ENCOUNTER — HOSPITAL ENCOUNTER (INPATIENT)
Facility: HOSPITAL | Age: 33
LOS: 6 days | Discharge: PSYCHIATRIC OR SUBSTANCE USE FACILITY | DRG: 988 | End: 2023-09-22
Attending: EMERGENCY MEDICINE | Admitting: INTERNAL MEDICINE
Payer: COMMERCIAL

## 2023-09-16 DIAGNOSIS — R41.0 DELIRIUM: ICD-10-CM

## 2023-09-16 DIAGNOSIS — E86.0 DEHYDRATION: ICD-10-CM

## 2023-09-16 DIAGNOSIS — R11.2 NAUSEA AND VOMITING: ICD-10-CM

## 2023-09-16 DIAGNOSIS — R00.0 TACHYARRHYTHMIA: ICD-10-CM

## 2023-09-16 DIAGNOSIS — R00.0 TACHYCARDIA: ICD-10-CM

## 2023-09-16 DIAGNOSIS — E10.10 DIABETIC KETOACIDOSIS WITHOUT COMA ASSOCIATED WITH TYPE 1 DIABETES MELLITUS: Primary | ICD-10-CM

## 2023-09-16 DIAGNOSIS — K80.20 SYMPTOMATIC CHOLELITHIASIS: ICD-10-CM

## 2023-09-16 DIAGNOSIS — E10.65 CONTROLLED TYPE 1 DIABETES MELLITUS WITH HYPERGLYCEMIA: ICD-10-CM

## 2023-09-16 LAB
ALBUMIN SERPL-MCNC: 4.8 G/DL (ref 3.5–5)
ALBUMIN/GLOB SERPL: 1.2 RATIO (ref 1.1–2)
ALP SERPL-CCNC: 63 UNIT/L (ref 40–150)
ALT SERPL-CCNC: 9 UNIT/L (ref 0–55)
AMPHET UR QL SCN: NEGATIVE
ANION GAP SERPL CALC-SCNC: 11 MEQ/L
ANION GAP SERPL CALC-SCNC: 16 MEQ/L
ANION GAP SERPL CALC-SCNC: 8 MEQ/L
APPEARANCE UR: CLEAR
AST SERPL-CCNC: 14 UNIT/L (ref 5–34)
B-HCG UR QL: NEGATIVE
B-OH-BUTYR SERPL-MCNC: 5 MMOL/L
BACTERIA #/AREA URNS AUTO: ABNORMAL /HPF
BARBITURATE SCN PRESENT UR: NEGATIVE
BASE EXCESS BLD CALC-SCNC: -7.2 MMOL/L
BASOPHILS # BLD AUTO: 0.02 X10(3)/MCL
BASOPHILS NFR BLD AUTO: 0.3 %
BENZODIAZ UR QL SCN: NEGATIVE
BILIRUB SERPL-MCNC: 1.4 MG/DL
BILIRUB UR QL STRIP.AUTO: NEGATIVE
BLOOD GAS SAMPLE TYPE (OHS): ABNORMAL
BUN SERPL-MCNC: 10 MG/DL (ref 7–18.7)
BUN SERPL-MCNC: 8.3 MG/DL (ref 7–18.7)
BUN SERPL-MCNC: 8.5 MG/DL (ref 7–18.7)
BUN SERPL-MCNC: 9.9 MG/DL (ref 7–18.7)
CALCIUM SERPL-MCNC: 10.2 MG/DL (ref 8.4–10.2)
CALCIUM SERPL-MCNC: 9.6 MG/DL (ref 8.4–10.2)
CALCIUM SERPL-MCNC: 9.7 MG/DL (ref 8.4–10.2)
CALCIUM SERPL-MCNC: 9.8 MG/DL (ref 8.4–10.2)
CANNABINOIDS UR QL SCN: NEGATIVE
CHLORIDE SERPL-SCNC: 102 MMOL/L (ref 98–107)
CHLORIDE SERPL-SCNC: 105 MMOL/L (ref 98–107)
CHLORIDE SERPL-SCNC: 107 MMOL/L (ref 98–107)
CHLORIDE SERPL-SCNC: 107 MMOL/L (ref 98–107)
CK MB SERPL-MCNC: <1 NG/ML
CK SERPL-CCNC: 40 U/L (ref 29–168)
CO2 BLDA-SCNC: 16.3 MMOL/L
CO2 SERPL-SCNC: 15 MMOL/L (ref 22–29)
CO2 SERPL-SCNC: 15 MMOL/L (ref 22–29)
CO2 SERPL-SCNC: 19 MMOL/L (ref 22–29)
CO2 SERPL-SCNC: 20 MMOL/L (ref 22–29)
COCAINE UR QL SCN: NEGATIVE
COHGB MFR BLDA: 4.6 %
COLOR UR: COLORLESS
CREAT SERPL-MCNC: 0.7 MG/DL (ref 0.55–1.02)
CREAT SERPL-MCNC: 0.76 MG/DL (ref 0.55–1.02)
CREAT SERPL-MCNC: 0.77 MG/DL (ref 0.55–1.02)
CREAT SERPL-MCNC: 0.8 MG/DL (ref 0.55–1.02)
CREAT/UREA NIT SERPL: 11
CREAT/UREA NIT SERPL: 11
CREAT/UREA NIT SERPL: 14
CTP QC/QA: YES
EOSINOPHIL # BLD AUTO: 0 X10(3)/MCL (ref 0–0.9)
EOSINOPHIL NFR BLD AUTO: 0 %
ERYTHROCYTE [DISTWIDTH] IN BLOOD BY AUTOMATED COUNT: 11.4 % (ref 11.5–17)
ETHANOL SERPL-MCNC: <10 MG/DL
FENTANYL UR QL SCN: NEGATIVE
FIO2 BLOOD GAS (OHS): 21 %
GFR SERPLBLD CREATININE-BSD FMLA CKD-EPI: >60 MLS/MIN/1.73/M2
GLOBULIN SER-MCNC: 3.9 GM/DL (ref 2.4–3.5)
GLUCOSE SERPL-MCNC: 106 MG/DL (ref 74–100)
GLUCOSE SERPL-MCNC: 207 MG/DL (ref 74–100)
GLUCOSE SERPL-MCNC: 249 MG/DL (ref 74–100)
GLUCOSE SERPL-MCNC: 82 MG/DL (ref 74–100)
GLUCOSE UR QL STRIP.AUTO: ABNORMAL
HCO3 BLDA-SCNC: 15.5 MMOL/L
HCT VFR BLD AUTO: 43.8 % (ref 37–47)
HGB BLD-MCNC: 14.8 G/DL (ref 12–16)
HYALINE CASTS #/AREA URNS LPF: ABNORMAL /LPF
IMM GRANULOCYTES # BLD AUTO: 0.02 X10(3)/MCL (ref 0–0.04)
IMM GRANULOCYTES NFR BLD AUTO: 0.3 %
KETONES UR QL STRIP.AUTO: ABNORMAL
LACTATE SERPL-SCNC: 1 MMOL/L (ref 0.5–2.2)
LEUKOCYTE ESTERASE UR QL STRIP.AUTO: NEGATIVE
LYMPHOCYTES # BLD AUTO: 1.33 X10(3)/MCL (ref 0.6–4.6)
LYMPHOCYTES NFR BLD AUTO: 17.5 %
MCH RBC QN AUTO: 29.7 PG (ref 27–31)
MCHC RBC AUTO-ENTMCNC: 33.8 G/DL (ref 33–36)
MCV RBC AUTO: 87.8 FL (ref 80–94)
MDMA UR QL SCN: NEGATIVE
METHGB MFR BLDA: 1.1 %
MONOCYTES # BLD AUTO: 0.36 X10(3)/MCL (ref 0.1–1.3)
MONOCYTES NFR BLD AUTO: 4.7 %
MUCOUS THREADS URNS QL MICRO: ABNORMAL /LPF
NEUTROPHILS # BLD AUTO: 5.89 X10(3)/MCL (ref 2.1–9.2)
NEUTROPHILS NFR BLD AUTO: 77.2 %
NITRITE UR QL STRIP.AUTO: NEGATIVE
NRBC BLD AUTO-RTO: 0 %
O2 HB BLOOD GAS (OHS): 94.9 %
OPIATES UR QL SCN: NEGATIVE
OXYHGB MFR BLDA: 15.8 G/DL
PCO2 BLDA: 25 MMHG (ref 40–50)
PCP UR QL: NEGATIVE
PH BLDA: 7.4 [PH]
PH UR STRIP.AUTO: 5 [PH]
PH UR: 5 [PH] (ref 3–11)
PLATELET # BLD AUTO: 292 X10(3)/MCL (ref 130–400)
PMV BLD AUTO: 10.1 FL (ref 7.4–10.4)
PO2 BLDA: 198 MMHG (ref 30–40)
POCT GLUCOSE: 103 MG/DL (ref 70–110)
POCT GLUCOSE: 120 MG/DL (ref 70–110)
POCT GLUCOSE: 201 MG/DL (ref 70–110)
POCT GLUCOSE: 217 MG/DL (ref 70–110)
POCT GLUCOSE: 232 MG/DL (ref 70–110)
POCT GLUCOSE: 288 MG/DL (ref 70–110)
POCT GLUCOSE: 76 MG/DL (ref 70–110)
POCT GLUCOSE: 99 MG/DL (ref 70–110)
POTASSIUM SERPL-SCNC: 3.5 MMOL/L (ref 3.5–5.1)
POTASSIUM SERPL-SCNC: 3.9 MMOL/L (ref 3.5–5.1)
POTASSIUM SERPL-SCNC: 3.9 MMOL/L (ref 3.5–5.1)
POTASSIUM SERPL-SCNC: 4.3 MMOL/L (ref 3.5–5.1)
PROT SERPL-MCNC: 8.7 GM/DL (ref 6.4–8.3)
PROT UR QL STRIP.AUTO: NEGATIVE
RBC # BLD AUTO: 4.99 X10(6)/MCL (ref 4.2–5.4)
RBC #/AREA URNS AUTO: ABNORMAL /HPF
RBC UR QL AUTO: NEGATIVE
SAO2 % BLDA: 100.6 %
SODIUM SERPL-SCNC: 135 MMOL/L (ref 136–145)
SODIUM SERPL-SCNC: 135 MMOL/L (ref 136–145)
SODIUM SERPL-SCNC: 136 MMOL/L (ref 136–145)
SODIUM SERPL-SCNC: 137 MMOL/L (ref 136–145)
SP GR UR STRIP.AUTO: 1.02 (ref 1–1.03)
SQUAMOUS #/AREA URNS LPF: ABNORMAL /HPF
UROBILINOGEN UR STRIP-ACNC: NORMAL
WBC # SPEC AUTO: 7.62 X10(3)/MCL (ref 4.5–11.5)
WBC #/AREA URNS AUTO: ABNORMAL /HPF

## 2023-09-16 PROCEDURE — 96374 THER/PROPH/DIAG INJ IV PUSH: CPT

## 2023-09-16 PROCEDURE — 63600175 PHARM REV CODE 636 W HCPCS: Performed by: STUDENT IN AN ORGANIZED HEALTH CARE EDUCATION/TRAINING PROGRAM

## 2023-09-16 PROCEDURE — 85025 COMPLETE CBC W/AUTO DIFF WBC: CPT | Performed by: EMERGENCY MEDICINE

## 2023-09-16 PROCEDURE — 82803 BLOOD GASES ANY COMBINATION: CPT

## 2023-09-16 PROCEDURE — S5010 5% DEXTROSE AND 0.45% SALINE: HCPCS | Performed by: STUDENT IN AN ORGANIZED HEALTH CARE EDUCATION/TRAINING PROGRAM

## 2023-09-16 PROCEDURE — 81001 URINALYSIS AUTO W/SCOPE: CPT | Performed by: EMERGENCY MEDICINE

## 2023-09-16 PROCEDURE — 96375 TX/PRO/DX INJ NEW DRUG ADDON: CPT

## 2023-09-16 PROCEDURE — 82550 ASSAY OF CK (CPK): CPT | Performed by: EMERGENCY MEDICINE

## 2023-09-16 PROCEDURE — 82077 ASSAY SPEC XCP UR&BREATH IA: CPT | Performed by: EMERGENCY MEDICINE

## 2023-09-16 PROCEDURE — P9612 CATHETERIZE FOR URINE SPEC: HCPCS

## 2023-09-16 PROCEDURE — 63600175 PHARM REV CODE 636 W HCPCS: Performed by: EMERGENCY MEDICINE

## 2023-09-16 PROCEDURE — 82010 KETONE BODYS QUAN: CPT | Performed by: EMERGENCY MEDICINE

## 2023-09-16 PROCEDURE — 93005 ELECTROCARDIOGRAM TRACING: CPT

## 2023-09-16 PROCEDURE — 20000000 HC ICU ROOM

## 2023-09-16 PROCEDURE — 25000003 PHARM REV CODE 250: Performed by: STUDENT IN AN ORGANIZED HEALTH CARE EDUCATION/TRAINING PROGRAM

## 2023-09-16 PROCEDURE — 80307 DRUG TEST PRSMV CHEM ANLYZR: CPT | Performed by: EMERGENCY MEDICINE

## 2023-09-16 PROCEDURE — 82962 GLUCOSE BLOOD TEST: CPT

## 2023-09-16 PROCEDURE — 25000003 PHARM REV CODE 250: Performed by: EMERGENCY MEDICINE

## 2023-09-16 PROCEDURE — 99285 EMERGENCY DEPT VISIT HI MDM: CPT | Mod: 25

## 2023-09-16 PROCEDURE — 80053 COMPREHEN METABOLIC PANEL: CPT | Performed by: EMERGENCY MEDICINE

## 2023-09-16 PROCEDURE — 83605 ASSAY OF LACTIC ACID: CPT | Performed by: EMERGENCY MEDICINE

## 2023-09-16 PROCEDURE — 81025 URINE PREGNANCY TEST: CPT | Performed by: EMERGENCY MEDICINE

## 2023-09-16 PROCEDURE — 82553 CREATINE MB FRACTION: CPT | Performed by: EMERGENCY MEDICINE

## 2023-09-16 PROCEDURE — 84300 ASSAY OF URINE SODIUM: CPT | Performed by: STUDENT IN AN ORGANIZED HEALTH CARE EDUCATION/TRAINING PROGRAM

## 2023-09-16 PROCEDURE — 99900035 HC TECH TIME PER 15 MIN (STAT)

## 2023-09-16 RX ORDER — LORAZEPAM 2 MG/ML
0.5 INJECTION INTRAMUSCULAR
Status: COMPLETED | OUTPATIENT
Start: 2023-09-16 | End: 2023-09-16

## 2023-09-16 RX ORDER — LORAZEPAM 2 MG/ML
2 INJECTION INTRAMUSCULAR
Status: DISCONTINUED | OUTPATIENT
Start: 2023-09-16 | End: 2023-09-16

## 2023-09-16 RX ORDER — ENOXAPARIN SODIUM 100 MG/ML
40 INJECTION SUBCUTANEOUS EVERY 24 HOURS
Status: DISCONTINUED | OUTPATIENT
Start: 2023-09-16 | End: 2023-09-22 | Stop reason: HOSPADM

## 2023-09-16 RX ORDER — INSULIN ASPART 100 [IU]/ML
0-5 INJECTION, SOLUTION INTRAVENOUS; SUBCUTANEOUS
Status: DISCONTINUED | OUTPATIENT
Start: 2023-09-17 | End: 2023-09-22 | Stop reason: HOSPADM

## 2023-09-16 RX ORDER — DEXTROSE MONOHYDRATE 100 MG/ML
INJECTION, SOLUTION INTRAVENOUS
Status: DISCONTINUED | OUTPATIENT
Start: 2023-09-16 | End: 2023-09-22 | Stop reason: HOSPADM

## 2023-09-16 RX ORDER — INSULIN ASPART 100 [IU]/ML
0-5 INJECTION, SOLUTION INTRAVENOUS; SUBCUTANEOUS EVERY 6 HOURS PRN
Status: DISCONTINUED | OUTPATIENT
Start: 2023-09-16 | End: 2023-09-16

## 2023-09-16 RX ORDER — GLUCAGON 1 MG
1 KIT INJECTION
Status: DISCONTINUED | OUTPATIENT
Start: 2023-09-17 | End: 2023-09-22 | Stop reason: HOSPADM

## 2023-09-16 RX ORDER — GLUCAGON 1 MG
1 KIT INJECTION
Status: DISCONTINUED | OUTPATIENT
Start: 2023-09-16 | End: 2023-09-16

## 2023-09-16 RX ORDER — HYDRALAZINE HYDROCHLORIDE 20 MG/ML
10 INJECTION INTRAMUSCULAR; INTRAVENOUS EVERY 4 HOURS PRN
Status: DISCONTINUED | OUTPATIENT
Start: 2023-09-16 | End: 2023-09-22 | Stop reason: HOSPADM

## 2023-09-16 RX ORDER — MUPIROCIN 20 MG/G
OINTMENT TOPICAL 2 TIMES DAILY
Status: CANCELLED | OUTPATIENT
Start: 2023-09-17 | End: 2023-09-22

## 2023-09-16 RX ORDER — LABETALOL HCL 20 MG/4 ML
10 SYRINGE (ML) INTRAVENOUS EVERY 4 HOURS PRN
Status: DISCONTINUED | OUTPATIENT
Start: 2023-09-16 | End: 2023-09-22 | Stop reason: HOSPADM

## 2023-09-16 RX ORDER — SODIUM CHLORIDE 9 MG/ML
1000 INJECTION, SOLUTION INTRAVENOUS CONTINUOUS
Status: DISCONTINUED | OUTPATIENT
Start: 2023-09-16 | End: 2023-09-16

## 2023-09-16 RX ORDER — DEXTROSE MONOHYDRATE AND SODIUM CHLORIDE 5; .45 G/100ML; G/100ML
INJECTION, SOLUTION INTRAVENOUS CONTINUOUS PRN
Status: DISCONTINUED | OUTPATIENT
Start: 2023-09-16 | End: 2023-09-22 | Stop reason: HOSPADM

## 2023-09-16 RX ORDER — IBUPROFEN 200 MG
16 TABLET ORAL
Status: DISCONTINUED | OUTPATIENT
Start: 2023-09-17 | End: 2023-09-22 | Stop reason: HOSPADM

## 2023-09-16 RX ORDER — SODIUM CHLORIDE 0.9 % (FLUSH) 0.9 %
10 SYRINGE (ML) INJECTION
Status: DISCONTINUED | OUTPATIENT
Start: 2023-09-16 | End: 2023-09-22 | Stop reason: HOSPADM

## 2023-09-16 RX ORDER — IBUPROFEN 200 MG
24 TABLET ORAL
Status: DISCONTINUED | OUTPATIENT
Start: 2023-09-17 | End: 2023-09-22 | Stop reason: HOSPADM

## 2023-09-16 RX ORDER — INSULIN ASPART 100 [IU]/ML
0-5 INJECTION, SOLUTION INTRAVENOUS; SUBCUTANEOUS
Status: DISCONTINUED | OUTPATIENT
Start: 2023-09-17 | End: 2023-09-16

## 2023-09-16 RX ORDER — INSULIN ASPART 100 [IU]/ML
5 INJECTION, SOLUTION INTRAVENOUS; SUBCUTANEOUS
Status: DISCONTINUED | OUTPATIENT
Start: 2023-09-17 | End: 2023-09-18

## 2023-09-16 RX ORDER — LORAZEPAM 1 MG/1
2 TABLET ORAL EVERY 4 HOURS PRN
Status: DISCONTINUED | OUTPATIENT
Start: 2023-09-16 | End: 2023-09-16

## 2023-09-16 RX ORDER — DEXTROSE MONOHYDRATE AND SODIUM CHLORIDE 5; .45 G/100ML; G/100ML
INJECTION, SOLUTION INTRAVENOUS CONTINUOUS
Status: DISCONTINUED | OUTPATIENT
Start: 2023-09-17 | End: 2023-09-17

## 2023-09-16 RX ORDER — LORAZEPAM 2 MG/ML
1 INJECTION INTRAMUSCULAR
Status: DISCONTINUED | OUTPATIENT
Start: 2023-09-16 | End: 2023-09-18

## 2023-09-16 RX ADMIN — LORAZEPAM 0.5 MG: 2 INJECTION INTRAMUSCULAR; INTRAVENOUS at 01:09

## 2023-09-16 RX ADMIN — SODIUM CHLORIDE 1000 ML: 9 INJECTION, SOLUTION INTRAVENOUS at 06:09

## 2023-09-16 RX ADMIN — INSULIN HUMAN 0.1 UNITS/KG/HR: 1 INJECTION, SOLUTION INTRAVENOUS at 06:09

## 2023-09-16 RX ADMIN — ENOXAPARIN SODIUM 40 MG: 40 INJECTION SUBCUTANEOUS at 04:09

## 2023-09-16 RX ADMIN — LORAZEPAM 0.5 MG: 2 INJECTION INTRAMUSCULAR; INTRAVENOUS at 04:09

## 2023-09-16 RX ADMIN — INSULIN DETEMIR 8 UNITS: 100 INJECTION, SOLUTION SUBCUTANEOUS at 04:09

## 2023-09-16 RX ADMIN — LORAZEPAM 1 MG: 2 INJECTION INTRAMUSCULAR; INTRAVENOUS at 11:09

## 2023-09-16 RX ADMIN — INSULIN DETEMIR 5 UNITS: 100 INJECTION, SOLUTION SUBCUTANEOUS at 10:09

## 2023-09-16 RX ADMIN — SODIUM CHLORIDE 1000 ML: 9 INJECTION, SOLUTION INTRAVENOUS at 10:09

## 2023-09-16 RX ADMIN — SODIUM CHLORIDE, POTASSIUM CHLORIDE, SODIUM LACTATE AND CALCIUM CHLORIDE 1000 ML: 600; 310; 30; 20 INJECTION, SOLUTION INTRAVENOUS at 04:09

## 2023-09-16 RX ADMIN — DEXTROSE AND SODIUM CHLORIDE: 5; 450 INJECTION, SOLUTION INTRAVENOUS at 08:09

## 2023-09-16 RX ADMIN — HUMAN INSULIN 10 UNITS: 100 INJECTION, SOLUTION SUBCUTANEOUS at 12:09

## 2023-09-16 RX ADMIN — DEXTROSE AND SODIUM CHLORIDE: 5; 450 INJECTION, SOLUTION INTRAVENOUS at 04:09

## 2023-09-16 NOTE — ED PROVIDER NOTES
Encounter Date: 9/16/2023       History     Chief Complaint   Patient presents with    Altered Mental Status     Pt was inpt at [facility] for voluntary psych admission.  Pt became confused, guarded, and non verbal .  Hx of DM.   initially.   on arrival to er.  Hx of dka as well     Strange mental status; ? Intoxicated vs psychiatric decompensation, ? Diabetes with non compliance ?      Neurologic Problem  The primary symptoms include altered mental status, dizziness and paresthesias. Primary symptoms do not include headaches, syncope, loss of consciousness, seizures, visual change, focal weakness, loss of sensation, speech change, memory loss, fever, nausea or vomiting. The symptoms began today. Episode duration: worsening over several weeks. The symptoms are waxing and waning. The neurological symptoms are diffuse. Context: worsening while at [facility] after medical clearance at [facility] yesterday.   Dizziness does not occur with nausea or vomiting.     Review of patient's allergies indicates:  No Known Allergies  Past Medical History:   Diagnosis Date    Diabetes mellitus type I     TMJ (dislocation of temporomandibular joint)     Vitamin D deficiency      Past Surgical History:   Procedure Laterality Date    BACK SURGERY      1999     Family History   Problem Relation Age of Onset    Mitral valve prolapse Mother     Other Father         mental concerns    Other Sister         heart issues    No Known Problems Brother     Breast cancer Maternal Grandmother      Social History     Tobacco Use    Smoking status: Never    Smokeless tobacco: Never   Substance Use Topics    Alcohol use: Yes     Comment: Occasionally    Drug use: Never     Review of Systems   Constitutional:  Negative for fever.   Cardiovascular:  Negative for syncope.   Gastrointestinal:  Negative for nausea and vomiting.   Neurological:  Positive for dizziness and paresthesias. Negative for speech change, focal  weakness, seizures, loss of consciousness and headaches.   Psychiatric/Behavioral:  Negative for memory loss.    All other systems reviewed and are negative.      Physical Exam     Initial Vitals [09/16/23 1002]   BP Pulse Resp Temp SpO2   (!) 179/116 (!) 142 (!) 28 -- 99 %      MAP       --         Physical Exam    Nursing note and vitals reviewed.  Constitutional: She appears well-developed and well-nourished. She is not diaphoretic. No distress.   HENT:   Head: Normocephalic and atraumatic.   Right Ear: External ear normal.   Left Ear: External ear normal.   Eyes: EOM are normal. Pupils are equal, round, and reactive to light. Right eye exhibits no discharge. Left eye exhibits no discharge.   Neck: Neck supple. No thyromegaly present. No tracheal deviation present. No JVD present.   Normal range of motion.  Cardiovascular:  Normal rate, regular rhythm, normal heart sounds and intact distal pulses.     Exam reveals no gallop and no friction rub.       No murmur heard.  Pulmonary/Chest: Breath sounds normal. No stridor. No respiratory distress. She has no wheezes. She has no rhonchi. She has no rales.   Abdominal: Abdomen is soft. Bowel sounds are normal. She exhibits no distension. There is no abdominal tenderness. There is no rebound and no guarding.   Musculoskeletal:         General: No tenderness or edema. Normal range of motion.      Cervical back: Normal range of motion and neck supple.     Neurological: She is alert. No cranial nerve deficit or sensory deficit. GCS eye subscore is 4. GCS verbal subscore is 5. GCS motor subscore is 6.   Patient alert and without focal abnormal findings but with difficulty concentrating, imperfect cooperation ;   Skin: Skin is warm and dry. Capillary refill takes less than 2 seconds. No rash and no abscess noted. No erythema. No pallor.   Psychiatric:   Loosening of associations; some responses are appropriate and lucid while others are tangiential or non communicative ;          ED Course   Procedures  Labs Reviewed   COMPREHENSIVE METABOLIC PANEL - Abnormal; Notable for the following components:       Result Value    Sodium Level 135 (*)     Carbon Dioxide 15 (*)     Glucose Level 249 (*)     Protein Total 8.7 (*)     Globulin 3.9 (*)     All other components within normal limits   BLOOD GAS - Abnormal; Notable for the following components:    pCO2, Blood gas 25.0 (*)     pO2, Blood gas 198.0 (*)     All other components within normal limits   BETA - HYDROXYBUTYRATE, SERUM - Abnormal; Notable for the following components:    Beta Hydroxybutyrate 5.00 (*)     All other components within normal limits   URINALYSIS, REFLEX TO URINE CULTURE - Abnormal; Notable for the following components:    Color, UA Colorless (*)     Glucose, UA 4+ (*)     Ketones, UA 4+ (*)     Bacteria, UA Trace (*)     Squamous Epithelial Cells, UA Trace (*)     Mucous, UA Trace (*)     All other components within normal limits   CBC WITH DIFFERENTIAL - Abnormal; Notable for the following components:    RDW 11.4 (*)     All other components within normal limits   POCT GLUCOSE - Abnormal; Notable for the following components:    POCT Glucose 217 (*)     All other components within normal limits   POCT GLUCOSE - Abnormal; Notable for the following components:    POCT Glucose 232 (*)     All other components within normal limits   POCT GLUCOSE - Abnormal; Notable for the following components:    POCT Glucose 120 (*)     All other components within normal limits   LACTIC ACID, PLASMA - Normal   CK-MB - Normal   CK - Normal   DRUG SCREEN, URINE (BEAKER) - Normal    Narrative:     Cut off concentrations:    Amphetamines - 1000 ng/ml  Barbiturates - 200 ng/ml  Benzodiazepine - 200 ng/ml  Cannabinoids (THC) - 50 ng/ml  Cocaine - 300 ng/ml  Fentanyl - 1.0 ng/ml  MDMA - 500 ng/ml  Opiates - 300 ng/ml   Phencyclidine (PCP) - 25 ng/ml    Specimen submitted for drug analysis and tested for pH and specific gravity in order to  evaluate sample integrity. Suspect tampering if specific gravity is <1.003 and/or pH is not within the range of 4.5 - 8.0  False negatives may result form substances such as bleach added to urine.  False positives may result for the presence of a substance with similar chemical structure to the drug or its metabolite.    This test provides only a PRELIMINARY analytical test result. A more specific alternate chemical method must be used in order to obtain a confirmed analytical result. Gas chromatography/mass spectrometry (GC/MS) is the preferred confirmatory method. Other chemical confirmation methods are available. Clinical consideration and professional judgement should be applied to any drug of abuse test result, particularly when preliminary positive results are used.    Positive results will be confirmed only at the physicians request. Unconfirmed screening results are to be used only for medical purposes (treatment).        ALCOHOL,MEDICAL (ETHANOL) - Normal   CBC W/ AUTO DIFFERENTIAL    Narrative:     The following orders were created for panel order CBC auto differential.  Procedure                               Abnormality         Status                     ---------                               -----------         ------                     CBC with Differential[[id number]]       Abnormal            Final result                 Please view results for these tests on the individual orders.   EXTRA TUBES    Narrative:     The following orders were created for panel order EXTRA TUBES.  Procedure                               Abnormality         Status                     ---------                               -----------         ------                     Light Blue Top Hold[[id number]]                             In process                 Red Top Hold[[id number]]                                    In process                 Gold Top Hold[[id number]]                                   In process                    Please view results for these tests on the individual orders.   LIGHT BLUE TOP HOLD   RED TOP HOLD   GOLD TOP HOLD   BASIC METABOLIC PANEL   BASIC METABOLIC PANEL   POCT URINE PREGNANCY   POCT GLUCOSE, HAND-HELD DEVICE   POCT GLUCOSE MONITORING CONTINUOUS     EKG Readings: (Independently Interpreted)   ST @ 106, non acute and non ischemic appearing ;     ECG Results              EKG 12-lead ([place] and Vomiting for diabetes) Age > 20 (Final result)  Result time 09/16/23 13:31:25      Final result by Interface, Lab In [facility] (09/16/23 13:31:25)                   Narrative:    Test Reason : R11.2,    Vent. Rate : 102 BPM     Atrial Rate : 102 BPM     P-R Int : 136 ms          QRS Dur : 080 ms      QT Int : 342 ms       P-R-T Axes : 108 119 108 degrees     QTc Int : 445 ms     Suspect arm lead reversal, interpretation assumes no reversal  Sinus tachycardia  Abnormal ECG  No previous ECGs available  Confirmed by Guillermo Jordan MD ([id number]) on 9/16/2023 1:31:10 PM    Referred By:             Confirmed By:Guillermo Jordan MD                                  Imaging Results    None          Medications   sodium chloride 0.9% flush 10 mL (has no administration in time range)   0.9%  NaCl infusion (has no administration in time range)   dextrose 5 % and 0.45 % NaCl infusion (has no administration in time range)   dextrose 10 % infusion (has no administration in time range)   dextrose 10 % infusion (has no administration in time range)   enoxaparin injection 40 mg (has no administration in time range)   insulin regular in 0.9 % NaCl 100 unit/100 mL (1 unit/mL) infusion (has no administration in time range)   dextrose 10% bolus 125 mL 125 mL (has no administration in time range)   dextrose 10% bolus 250 mL 250 mL (has no administration in time range)   sodium chloride 0.9% flush 10 mL (has no administration in time range)   sodium chloride 0.9% bolus 1,000 mL 1,000 mL ( Intravenous Stopped 9/16/23 1311)   insulin regular  injection 10 Units 0.1 mL (10 Units Intravenous Given 9/16/23 1252)   LORazepam injection 0.5 mg (0.5 mg Intravenous Given 9/16/23 1308)     Medical Decision Making  Sufficiently strange mental status raises questions about organic delirium vs intoxicated delirium vs decompensated psychiatric condition.  ultimately arrives and is able to point out that patient has diagnosis of bipolar and has been having trouble complying with her medications recently (including insulin for IDDM) over the past several weeks.     Amount and/or Complexity of Data Reviewed  Independent Historian: spouse     Details: As described above  External Data Reviewed: labs and notes.  Labs: ordered.  ECG/medicine tests: ordered and independent interpretation performed. Decision-making details documented in ED Course.  Discussion of management or test interpretation with external provider(s): In patient medicine team aware of case, plan admit ;    Risk  OTC drugs.  Prescription drug management.  Decision regarding hospitalization.  Risk Details: Lab data demonstrate dka; findings copatible with both decompensated mental health condition and also organic delirium in setting dka ; risk found sufficient to warrant admission for further evaluation, supervision of clinical course.               ED Course as of 09/16/23 1439   Sat Sep 16, 2023   1106 Blood gas compatible with metabolic acidosis with respiratory compensation ; [CT]   1149 Contaminated ua, unconvincing as uti; mild dehydration ; [CT]   1149 Negative upt ; [CT]   1149 Chemistries demonstrate anion gap 17 ; [CT]   1150 Negative ethanol level ; [CT]   1150 Normal lactate ; [CT]   1202 Normal ckmb ; [CT]   1235 Negative utox ; [CT]      ED Course User Index  [CT] Melvin Bain MD                    Clinical Impression:   Final diagnoses:  [R11.2] Nausea and vomiting  [E10.10] Diabetic ketoacidosis without coma associated with type 1 diabetes mellitus (Primary)  [R41.0]  Delirium  [E86.0] Dehydration        ED Disposition Condition    Admit                 Taya, Melvin PEREZ MD  09/16/23 0206

## 2023-09-17 LAB
ALBUMIN SERPL-MCNC: 3.8 G/DL (ref 3.5–5)
ALBUMIN/GLOB SERPL: 1.3 RATIO (ref 1.1–2)
ALP SERPL-CCNC: 59 UNIT/L (ref 40–150)
ALT SERPL-CCNC: 6 UNIT/L (ref 0–55)
ANION GAP SERPL CALC-SCNC: 6 MEQ/L
ANION GAP SERPL CALC-SCNC: 7 MEQ/L
ANION GAP SERPL CALC-SCNC: 7 MEQ/L
ANION GAP SERPL CALC-SCNC: 8 MEQ/L
AST SERPL-CCNC: 12 UNIT/L (ref 5–34)
BASOPHILS # BLD AUTO: 0.03 X10(3)/MCL
BASOPHILS NFR BLD AUTO: 0.4 %
BILIRUB SERPL-MCNC: 1.1 MG/DL
BUN SERPL-MCNC: 10.7 MG/DL (ref 7–18.7)
BUN SERPL-MCNC: 11 MG/DL (ref 7–18.7)
BUN SERPL-MCNC: 7.9 MG/DL (ref 7–18.7)
BUN SERPL-MCNC: 8.2 MG/DL (ref 7–18.7)
BUN SERPL-MCNC: 8.5 MG/DL (ref 7–18.7)
CALCIUM SERPL-MCNC: 8.7 MG/DL (ref 8.4–10.2)
CALCIUM SERPL-MCNC: 9 MG/DL (ref 8.4–10.2)
CALCIUM SERPL-MCNC: 9 MG/DL (ref 8.4–10.2)
CALCIUM SERPL-MCNC: 9.2 MG/DL (ref 8.4–10.2)
CALCIUM SERPL-MCNC: 9.4 MG/DL (ref 8.4–10.2)
CHLORIDE SERPL-SCNC: 102 MMOL/L (ref 98–107)
CHLORIDE SERPL-SCNC: 103 MMOL/L (ref 98–107)
CHLORIDE SERPL-SCNC: 103 MMOL/L (ref 98–107)
CHLORIDE SERPL-SCNC: 106 MMOL/L (ref 98–107)
CHLORIDE SERPL-SCNC: 106 MMOL/L (ref 98–107)
CO2 SERPL-SCNC: 22 MMOL/L (ref 22–29)
CO2 SERPL-SCNC: 22 MMOL/L (ref 22–29)
CO2 SERPL-SCNC: 23 MMOL/L (ref 22–29)
CO2 SERPL-SCNC: 24 MMOL/L (ref 22–29)
CO2 SERPL-SCNC: 25 MMOL/L (ref 22–29)
CREAT SERPL-MCNC: 0.71 MG/DL (ref 0.55–1.02)
CREAT SERPL-MCNC: 0.74 MG/DL (ref 0.55–1.02)
CREAT SERPL-MCNC: 0.78 MG/DL (ref 0.55–1.02)
CREAT SERPL-MCNC: 0.87 MG/DL (ref 0.55–1.02)
CREAT SERPL-MCNC: 0.87 MG/DL (ref 0.55–1.02)
CREAT/UREA NIT SERPL: 11
CREAT/UREA NIT SERPL: 11
CREAT/UREA NIT SERPL: 12
CREAT/UREA NIT SERPL: 12
EOSINOPHIL # BLD AUTO: 0.07 X10(3)/MCL (ref 0–0.9)
EOSINOPHIL NFR BLD AUTO: 0.9 %
ERYTHROCYTE [DISTWIDTH] IN BLOOD BY AUTOMATED COUNT: 11.5 % (ref 11.5–17)
GFR SERPLBLD CREATININE-BSD FMLA CKD-EPI: >60 MLS/MIN/1.73/M2
GLOBULIN SER-MCNC: 2.9 GM/DL (ref 2.4–3.5)
GLUCOSE SERPL-MCNC: 158 MG/DL (ref 74–100)
GLUCOSE SERPL-MCNC: 209 MG/DL (ref 74–100)
GLUCOSE SERPL-MCNC: 291 MG/DL (ref 74–100)
GLUCOSE SERPL-MCNC: 453 MG/DL (ref 74–100)
GLUCOSE SERPL-MCNC: 453 MG/DL (ref 74–100)
HCT VFR BLD AUTO: 36.7 % (ref 37–47)
HGB BLD-MCNC: 12.5 G/DL (ref 12–16)
IMM GRANULOCYTES # BLD AUTO: 0.02 X10(3)/MCL (ref 0–0.04)
IMM GRANULOCYTES NFR BLD AUTO: 0.2 %
LYMPHOCYTES # BLD AUTO: 1.93 X10(3)/MCL (ref 0.6–4.6)
LYMPHOCYTES NFR BLD AUTO: 23.7 %
MCH RBC QN AUTO: 29.1 PG (ref 27–31)
MCHC RBC AUTO-ENTMCNC: 34.1 G/DL (ref 33–36)
MCV RBC AUTO: 85.5 FL (ref 80–94)
MONOCYTES # BLD AUTO: 0.68 X10(3)/MCL (ref 0.1–1.3)
MONOCYTES NFR BLD AUTO: 8.3 %
NEUTROPHILS # BLD AUTO: 5.43 X10(3)/MCL (ref 2.1–9.2)
NEUTROPHILS NFR BLD AUTO: 66.5 %
NRBC BLD AUTO-RTO: 0 %
PLATELET # BLD AUTO: 311 X10(3)/MCL (ref 130–400)
PMV BLD AUTO: 9.8 FL (ref 7.4–10.4)
POCT GLUCOSE: 149 MG/DL (ref 70–110)
POCT GLUCOSE: 175 MG/DL (ref 70–110)
POCT GLUCOSE: 222 MG/DL (ref 70–110)
POCT GLUCOSE: 326 MG/DL (ref 70–110)
POTASSIUM SERPL-SCNC: 3.4 MMOL/L (ref 3.5–5.1)
POTASSIUM SERPL-SCNC: 3.5 MMOL/L (ref 3.5–5.1)
POTASSIUM SERPL-SCNC: 4 MMOL/L (ref 3.5–5.1)
POTASSIUM SERPL-SCNC: 4.1 MMOL/L (ref 3.5–5.1)
POTASSIUM SERPL-SCNC: 4.1 MMOL/L (ref 3.5–5.1)
PROT SERPL-MCNC: 6.7 GM/DL (ref 6.4–8.3)
RBC # BLD AUTO: 4.29 X10(6)/MCL (ref 4.2–5.4)
SODIUM SERPL-SCNC: 131 MMOL/L (ref 136–145)
SODIUM SERPL-SCNC: 131 MMOL/L (ref 136–145)
SODIUM SERPL-SCNC: 134 MMOL/L (ref 136–145)
SODIUM SERPL-SCNC: 137 MMOL/L (ref 136–145)
SODIUM SERPL-SCNC: 137 MMOL/L (ref 136–145)
SODIUM UR-SCNC: 184 MMOL/L
WBC # SPEC AUTO: 8.16 X10(3)/MCL (ref 4.5–11.5)

## 2023-09-17 PROCEDURE — 25000003 PHARM REV CODE 250: Performed by: STUDENT IN AN ORGANIZED HEALTH CARE EDUCATION/TRAINING PROGRAM

## 2023-09-17 PROCEDURE — 63600175 PHARM REV CODE 636 W HCPCS: Performed by: STUDENT IN AN ORGANIZED HEALTH CARE EDUCATION/TRAINING PROGRAM

## 2023-09-17 PROCEDURE — 85025 COMPLETE CBC W/AUTO DIFF WBC: CPT | Performed by: STUDENT IN AN ORGANIZED HEALTH CARE EDUCATION/TRAINING PROGRAM

## 2023-09-17 PROCEDURE — 20000000 HC ICU ROOM

## 2023-09-17 PROCEDURE — 80053 COMPREHEN METABOLIC PANEL: CPT | Performed by: STUDENT IN AN ORGANIZED HEALTH CARE EDUCATION/TRAINING PROGRAM

## 2023-09-17 PROCEDURE — 80048 BASIC METABOLIC PNL TOTAL CA: CPT | Performed by: STUDENT IN AN ORGANIZED HEALTH CARE EDUCATION/TRAINING PROGRAM

## 2023-09-17 PROCEDURE — 83835 ASSAY OF METANEPHRINES: CPT | Performed by: INTERNAL MEDICINE

## 2023-09-17 PROCEDURE — S5010 5% DEXTROSE AND 0.45% SALINE: HCPCS | Performed by: STUDENT IN AN ORGANIZED HEALTH CARE EDUCATION/TRAINING PROGRAM

## 2023-09-17 RX ORDER — QUETIAPINE FUMARATE 25 MG/1
25 TABLET, FILM COATED ORAL ONCE
Status: DISCONTINUED | OUTPATIENT
Start: 2023-09-17 | End: 2023-09-17

## 2023-09-17 RX ORDER — HYDROXYZINE PAMOATE 25 MG/1
25 CAPSULE ORAL EVERY 6 HOURS PRN
Status: DISCONTINUED | OUTPATIENT
Start: 2023-09-17 | End: 2023-09-22 | Stop reason: HOSPADM

## 2023-09-17 RX ORDER — QUETIAPINE FUMARATE 25 MG/1
25 TABLET, FILM COATED ORAL 2 TIMES DAILY
Status: DISCONTINUED | OUTPATIENT
Start: 2023-09-17 | End: 2023-09-18

## 2023-09-17 RX ORDER — INSULIN ASPART 100 [IU]/ML
4 INJECTION, SOLUTION INTRAVENOUS; SUBCUTANEOUS ONCE
Status: COMPLETED | OUTPATIENT
Start: 2023-09-17 | End: 2023-09-17

## 2023-09-17 RX ORDER — QUETIAPINE FUMARATE 25 MG/1
50 TABLET, FILM COATED ORAL 2 TIMES DAILY
Status: DISCONTINUED | OUTPATIENT
Start: 2023-09-17 | End: 2023-09-17

## 2023-09-17 RX ORDER — SODIUM CHLORIDE 9 MG/ML
INJECTION, SOLUTION INTRAVENOUS CONTINUOUS
Status: DISCONTINUED | OUTPATIENT
Start: 2023-09-17 | End: 2023-09-22 | Stop reason: HOSPADM

## 2023-09-17 RX ADMIN — QUETIAPINE FUMARATE 25 MG: 25 TABLET ORAL at 08:09

## 2023-09-17 RX ADMIN — INSULIN DETEMIR 15 UNITS: 100 INJECTION, SOLUTION SUBCUTANEOUS at 09:09

## 2023-09-17 RX ADMIN — INSULIN ASPART 5 UNITS: 100 INJECTION, SOLUTION INTRAVENOUS; SUBCUTANEOUS at 12:09

## 2023-09-17 RX ADMIN — SODIUM CHLORIDE: 9 INJECTION, SOLUTION INTRAVENOUS at 02:09

## 2023-09-17 RX ADMIN — LORAZEPAM 1 MG: 2 INJECTION INTRAMUSCULAR; INTRAVENOUS at 03:09

## 2023-09-17 RX ADMIN — SODIUM CHLORIDE: 9 INJECTION, SOLUTION INTRAVENOUS at 01:09

## 2023-09-17 RX ADMIN — INSULIN ASPART 4 UNITS: 100 INJECTION, SOLUTION INTRAVENOUS; SUBCUTANEOUS at 10:09

## 2023-09-17 RX ADMIN — INSULIN ASPART 4 UNITS: 100 INJECTION, SOLUTION INTRAVENOUS; SUBCUTANEOUS at 02:09

## 2023-09-17 RX ADMIN — SODIUM CHLORIDE: 9 INJECTION, SOLUTION INTRAVENOUS at 11:09

## 2023-09-17 RX ADMIN — QUETIAPINE FUMARATE 25 MG: 25 TABLET ORAL at 07:09

## 2023-09-17 RX ADMIN — LORAZEPAM 1 MG: 2 INJECTION INTRAMUSCULAR; INTRAVENOUS at 11:09

## 2023-09-17 RX ADMIN — INSULIN ASPART 5 UNITS: 100 INJECTION, SOLUTION INTRAVENOUS; SUBCUTANEOUS at 05:09

## 2023-09-17 RX ADMIN — INSULIN ASPART 2 UNITS: 100 INJECTION, SOLUTION INTRAVENOUS; SUBCUTANEOUS at 05:09

## 2023-09-17 RX ADMIN — DEXTROSE AND SODIUM CHLORIDE: 5; 450 INJECTION, SOLUTION INTRAVENOUS at 12:09

## 2023-09-17 RX ADMIN — INSULIN ASPART 5 UNITS: 100 INJECTION, SOLUTION INTRAVENOUS; SUBCUTANEOUS at 07:09

## 2023-09-17 NOTE — PROGRESS NOTES
[facility] - ICU  Pulmonary Critical Care Note    Patient Name: Bette Dangelo  MRN: [id number]  Admission Date: 9/16/2023  Hospital Length of Stay: 1 days  Code Status: Full Code  Attending Provider: Dima Garber MD  Primary Care Provider: Sunshine Calderon FNP     Subjective:     HPI:   Pt is a 34yo F with PMH bipolar 1, T1DM, and HTN who presents to ED for control of her DM from [facility]. Reports feeling unwell over past few days. Trying to get to appt for outpatient cholecystectomy after finding +gallstones as cause of abd pain, nausea with meals. Has not been able to eat per usual. Not currently on any medications for bipolar disorder. Only on insulin pump for DM, unsure if pump has been working as intended. Was cleared by [facility] yesterday for admission to [facility], however, CBG continued to worsen and was >350 today. Reports +anxiety, however, denies any fevers, chills. +polyuria and polydipsia; h/o dka in the past.     In ED, pt was AF, HR mildly elevated and s/p ativan 0.5mg. BOHB 5.0, UA with +ketones, +metabolic acidosis with AG 18. VBG with pH 7.4, insulin pump removed and placed with belongings.    Hospital Course/Significant events:  9/16/23 - admitted to ICU for DKA    24 Hour Interval History:  NAEON. Gap closed and DKA resolved. Pt transitioned with subQ insulin, however, having increased paranoia and anxiety regarding care. Calms with extensive discussion and redirection. Still requiring close monitoring at this time due to stability of underlying psych disorder. Otherwise would have been stable for downgrade to floor.    Past Medical History:   Diagnosis Date    Diabetes mellitus type I     TMJ (dislocation of temporomandibular joint)     Vitamin D deficiency        Past Surgical History:   Procedure Laterality Date    BACK SURGERY      1999       Social History     Socioeconomic History    Marital status:    Tobacco Use    Smoking status: Never     Smokeless tobacco: Never   Substance and Sexual Activity    Alcohol use: Yes     Comment: Occasionally    Drug use: Never    Sexual activity: Yes           Current Outpatient Medications   Medication Instructions    BAQSIMI 3 mg, Nasal, Daily PRN    cholecalciferol (vitamin D3) 50,000 Units, Oral, Every 7 days    cholecalciferol (vitamin D3) 50,000 Units, Oral    clindamycin (CLEOCIN) 300 MG capsule Oral    clomiPHENE (CLOMID) 50 mg tablet Oral    famotidine (PEPCID) 20 mg, Oral, 2 times daily    fish oil/borage/flax/om3,6,9 1 (OMEGA 3-6-9 ORAL) Oral    FLOVENT  mcg/actuation inhaler 2 puffs, Inhalation, 2 times daily    fluticasone propionate (FLONASE) 50 mcg/actuation nasal spray 1 spray, Each Nostril, Daily    hydrOXYzine pamoate (VISTARIL) 25 MG Cap Once as needed    insulin aspart U-100 (NOVOLOG) 100 unit/mL (3 mL) InPn pen Novolog Flexpen U-100 Insulin Take No date recorded No form recorded No frequency recorded No route recorded No set duration recorded No set duration amount recorded active No dosage strength recorded No dosage strength units of measure recorded    insulin aspart U-100 (NOVOLOG) 100 unit/mL injection 300 units every 3 days per insulin pump    insulin 16 Units, Subcutaneous    meloxicam (MOBIC) 7.5 MG tablet 1 tablet, Oral, Nightly    meloxicam (MOBIC) 7.5 mg, Oral, Nightly    ondansetron (ZOFRAN) 8 mg, Oral, Every 8 hours PRN    ondansetron (ZOFRAN-ODT) 4 mg, Oral, Every 6 hours PRN       Current Inpatient Medications   enoxparin  40 mg Subcutaneous Daily    insulin aspart U-100  5 Units Subcutaneous TIDWM    insulin detemir U-100  15 Units Subcutaneous QHS    QUEtiapine  25 mg Oral BID       Current Intravenous Infusions   sodium chloride 0.9% 100 mL/hr at 09/17/23 1320    dextrose 5 % and 0.45 % NaCl Stopped (09/16/23 2328)    insulin regular 1 units/mL infusion orderable (DKA) Stopped (09/16/23 2328)         ROS   See above hpi    Objective:       Intake/Output Summary (Last 24  hours) at 9/17/2023 1358  Last data filed at 9/17/2023 1045  Gross per 24 hour   Intake 2280.7 ml   Output 2400 ml   Net -119.3 ml           Vital Signs (Most Recent):  Temp: 98.6 °F (37 °C) (09/17/23 0800)  Pulse: 106 (09/17/23 1000)  Resp: 20 (09/17/23 1000)  BP: 111/79 (09/17/23 1000)  SpO2: 100 % (09/17/23 0800)  Body mass index is 22.93 kg/m².  Weight: 66.4 kg (146 lb 6.2 oz) Vital Signs (24h Range):  Temp:  [98.1 °F (36.7 °C)-98.6 °F (37 °C)] 98.6 °F (37 °C)  Pulse:  [] 106  Resp:  [11-27] 20  SpO2:  [95 %-100 %] 100 %  BP: (108-169)/() 111/79     Physical Exam  Constitutional:       General: She is not in acute distress.     Appearance: She is normal weight. She is not toxic-appearing.      Comments: Appears tired, anxious   HENT:      Head: Normocephalic and atraumatic.      Nose: Nose normal.   Eyes:      Extraocular Movements: Extraocular movements intact.      Conjunctiva/sclera: Conjunctivae normal.   Cardiovascular:      Rate and Rhythm: Regular rhythm. Tachycardia present.      Pulses: Normal pulses.      Heart sounds: Normal heart sounds. No murmur heard.  Pulmonary:      Effort: Pulmonary effort is normal. No respiratory distress.      Breath sounds: Normal breath sounds. No wheezing or rales.   Abdominal:      General: Abdomen is flat. Bowel sounds are normal.      Palpations: Abdomen is soft.      Tenderness: There is abdominal tenderness.      Comments: Generalized tenderness, worse predominantly in RUQ   Musculoskeletal:         General: No swelling.      Cervical back: Neck supple.   Skin:     General: Skin is warm and dry.   Neurological:      General: No focal deficit present.   Psychiatric:         Behavior: Behavior normal.      Comments: +anxious           Lines/Drains/Airways       Peripheral Intravenous Line  Duration                  Peripheral IV - Single Lumen 09/16/23 1020 20 G Left;Posterior Hand 1 day                    Significant Labs:    Lab Results   Component Value  Date    WBC 8.16 09/17/2023    HGB 12.5 09/17/2023    HCT 36.7 (L) 09/17/2023    MCV 85.5 09/17/2023     09/17/2023           BMP  Lab Results   Component Value Date     (L) 09/17/2023    K 3.5 09/17/2023    CHLORIDE 102 09/17/2023    CO2 25 09/17/2023    BUN 7.9 09/17/2023    CREATININE 0.74 09/17/2023    CALCIUM 9.2 09/17/2023    AGAP 7.0 09/17/2023    EGFRNONAA >60 11/01/2017         ABG  Recent Labs   Lab 09/16/23  1039   PH 7.400   PO2 198.0*   PCO2 25.0*   HCO3 15.5   POCBASEDEF -7.20         Mechanical Ventilation Support: none         Significant Imaging:  I have reviewed the pertinent imaging within the past 24 hours.        Assessment/Plan:     Assessment  DKA (mild) in setting of T1DM  Sinus tachycardia  Bipolar 1 disorder  Cholelithiasis    Plan  Keep in ICU for one for day due to unstable underlying mood disorder and anxiety/paranoia.   Consult psych in the am- for eval of pt  Start seroquel bid today, pending response, can get klonopin. Ativan and hydroxyzine prn.  Sinus tach improved with total 2L bolus (1L NS and 1L LR) and ativan prn for anxiety. No hallucinations, but +anxious appearance. TSH yesterday wnl. UDS, ethanol level wnl.  Abd US yesterday with +cholelithiasis without cholecystitis. Will cont to monitor for now. Consult surgery if sxs evolve or if new sxs develop    DVT Prophylaxis: lovenox  GI Prophylaxis: n/a     32 minutes of critical care was time spent personally by me on the following activities: development of treatment plan with patient or surrogate and bedside caregivers, discussions with consultants, evaluation of patient's response to treatment, examination of patient, ordering and performing treatments and interventions, ordering and review of laboratory studies, ordering and review of radiographic studies, pulse oximetry, re-evaluation of patient's condition.  This critical care time did not overlap with that of any other provider or involve time for any  procedures.     Ami Jeffries MD  Pulmonary Critical Care Medicine  [facility] - ICU

## 2023-09-17 NOTE — H&P
[facility] - ICU  Pulmonary Critical Care Note    Patient Name: Bette Dangelo  MRN: [id number]  Admission Date: 9/16/2023  Hospital Length of Stay: 0 days  Code Status: Full Code  Attending Provider: Dima Garber MD  Primary Care Provider: Sunshine Calderon FNP     Subjective:     HPI:   Pt is a 32yo F with PMH bipolar 1, T1DM, and HTN who presents to ED for control of her DM from [facility]. Reports feeling unwell over past few days. Trying to get to appt for outpatient cholecystectomy after finding +gallstones as cause of abd pain, nausea with meals. Has not been able to eat per usual. Not currently on any medications for bipolar disorder. Only on insulin pump for DM, unsure if pump has been working as intended. Was cleared by [facility] yesterday for admission [facility], however, CBG continued to worsen and was >350 today. Reports +anxiety, however, denies any fevers, chills. +polyuria and polydipsia; h/o dka in the past.     In ED, pt was AF, HR mildly elevated and s/p ativan 0.5mg. BOHB 5.0, UA with +ketones, +metabolic acidosis with AG 18. VBG with pH 7.4, insulin pump removed and placed with belongings.    Hospital Course/Significant events:  9/16/23 - admitted to ICU for DKA    24 Hour Interval History:      Past Medical History:   Diagnosis Date    Diabetes mellitus type I     TMJ (dislocation of temporomandibular joint)     Vitamin D deficiency        Past Surgical History:   Procedure Laterality Date    BACK SURGERY      1999       Social History     Socioeconomic History    Marital status:    Tobacco Use    Smoking status: Never    Smokeless tobacco: Never   Substance and Sexual Activity    Alcohol use: Yes     Comment: Occasionally    Drug use: Never    Sexual activity: Yes           Current Outpatient Medications   Medication Instructions    BAQSIMI 3 mg, Nasal, Daily PRN    cholecalciferol (vitamin D3) 50,000 Units, Oral, Every 7 days    cholecalciferol (vitamin  D3) 50,000 Units, Oral    clindamycin (CLEOCIN) 300 MG capsule Oral    clomiPHENE (CLOMID) 50 mg tablet Oral    famotidine (PEPCID) 20 mg, Oral, 2 times daily    fish oil/borage/flax/om3,6,9 1 (OMEGA 3-6-9 ORAL) Oral    FLOVENT  mcg/actuation inhaler 2 puffs, Inhalation, 2 times daily    fluticasone propionate (FLONASE) 50 mcg/actuation nasal spray 1 spray, Each Nostril, Daily    hydrOXYzine pamoate (VISTARIL) 25 MG Cap Once as needed    insulin aspart U-100 (NOVOLOG) 100 unit/mL (3 mL) InPn pen Novolog Flexpen U-100 Insulin Take No date recorded No form recorded No frequency recorded No route recorded No set duration recorded No set duration amount recorded active No dosage strength recorded No dosage strength units of measure recorded    insulin aspart U-100 (NOVOLOG) 100 unit/mL injection 300 units every 3 days per insulin pump    insulin 16 Units, Subcutaneous    meloxicam (MOBIC) 7.5 MG tablet 1 tablet, Oral, Nightly    meloxicam (MOBIC) 7.5 mg, Oral, Nightly    ondansetron (ZOFRAN) 8 mg, Oral, Every 8 hours PRN    ondansetron (ZOFRAN-ODT) 4 mg, Oral, Every 6 hours PRN       Current Inpatient Medications   enoxparin  40 mg Subcutaneous Daily    [START ON 9/17/2023] insulin aspart U-100  5 Units Subcutaneous TIDWM    [START ON 9/17/2023] insulin detemir U-100  15 Units Subcutaneous QHS       Current Intravenous Infusions   sodium chloride 0.9% Stopped (09/16/23 2040)    dextrose 5 % and 0.45 % NaCl 125 mL/hr at 09/16/23 2222    insulin regular 1 units/mL infusion orderable (DKA) 0.02 Units/kg/hr (09/16/23 2222)         ROS   See above hpi    Objective:       Intake/Output Summary (Last 24 hours) at 9/16/2023 2255  Last data filed at 9/16/2023 2222  Gross per 24 hour   Intake 2621.01 ml   Output 1050 ml   Net 1571.01 ml         Vital Signs (Most Recent):  Temp: 98.1 °F (36.7 °C) (09/16/23 2000)  Pulse: (!) 111 (09/16/23 2210)  Resp: 18 (09/16/23 2210)  BP: (!) 137/107 (09/16/23 2210)  SpO2: 99 % (09/16/23  2210)  Body mass index is 22.93 kg/m².  Weight: 66.4 kg (146 lb 6.2 oz) Vital Signs (24h Range):  Temp:  [98.1 °F (36.7 °C)-98.5 °F (36.9 °C)] 98.1 °F (36.7 °C)  Pulse:  [104-156] 111  Resp:  [17-28] 18  SpO2:  [98 %-100 %] 99 %  BP: (137-179)/() 137/107     Physical Exam  Constitutional:       General: She is not in acute distress.     Appearance: She is normal weight. She is not toxic-appearing.      Comments: Appears tired, anxious   HENT:      Head: Normocephalic and atraumatic.      Nose: Nose normal.   Eyes:      Extraocular Movements: Extraocular movements intact.      Conjunctiva/sclera: Conjunctivae normal.   Cardiovascular:      Rate and Rhythm: Regular rhythm. Tachycardia present.      Pulses: Normal pulses.      Heart sounds: Normal heart sounds. No murmur heard.  Pulmonary:      Effort: Pulmonary effort is normal. No respiratory distress.      Breath sounds: Normal breath sounds. No wheezing or rales.   Abdominal:      General: Abdomen is flat. Bowel sounds are normal.      Palpations: Abdomen is soft.      Tenderness: There is abdominal tenderness.      Comments: Generalized tenderness, worse predominantly in RUQ   Musculoskeletal:         General: No swelling.      Cervical back: Neck supple.   Skin:     General: Skin is warm and dry.   Neurological:      General: No focal deficit present.   Psychiatric:         Behavior: Behavior normal.      Comments: +anxious           Lines/Drains/Airways       Peripheral Intravenous Line  Duration                  Peripheral IV - Single Lumen 09/16/23 1020 20 G Left;Posterior Hand <1 day                    Significant Labs:    Lab Results   Component Value Date    WBC 7.62 09/16/2023    HGB 14.8 09/16/2023    HCT 43.8 09/16/2023    MCV 87.8 09/16/2023     09/16/2023           BMP  Lab Results   Component Value Date     (L) 09/16/2023    K 3.5 09/16/2023    CHLORIDE 107 09/16/2023    CO2 20 (L) 09/16/2023    BUN 10.0 09/16/2023    CREATININE  0.70 09/16/2023    CALCIUM 9.6 09/16/2023    AGAP 8.0 09/16/2023    EGFRNONAA >60 11/01/2017         ABG  Recent Labs   Lab 09/16/23  1039   PH 7.400   PO2 198.0*   PCO2 25.0*   HCO3 15.5   POCBASEDEF -7.20       Mechanical Ventilation Support:         Significant Imaging:  I have reviewed the pertinent imaging within the past 24 hours.        Assessment/Plan:     Assessment  DKA (mild) in setting of T1DM  Sinus tachycardia  Bipolar 1 disorder  Cholelithiasis    Plan  Admit to ICU for insulin drip and dka protocol. Pt received 10u insulin IV with resolution of hyperglycemia, gap closed and attempted to transition with 8u detemir, however, gap later re-opened with rise in cbgs therefore pt was placed back on dka protocol  Sinus tach improved with total 2L bolus (1L NS and 1L LR) and ativan prn for anxiety. No hallucinations, but +anxious appearance. TSH yesterday wnl. UDS, ethanol level wnl.  Defer initiation of bipolar disorder medications to psychiatry when pt stable for admission to [facility] vs inpatient psych consult if pt still here Monday when service available again  Abd US yesterday with +cholelithiasis without cholecystitis. Will cont to monitor for now. Consult surgery if sxs evolve or if new sxs develop    DVT Prophylaxis: lovenox  GI Prophylaxis:     32 minutes of critical care was time spent personally by me on the following activities: development of treatment plan with patient or surrogate and bedside caregivers, discussions with consultants, evaluation of patient's response to treatment, examination of patient, ordering and performing treatments and interventions, ordering and review of laboratory studies, ordering and review of radiographic studies, pulse oximetry, re-evaluation of patient's condition.  This critical care time did not overlap with that of any other provider or involve time for any procedures.     Ami Jeffries MD  Pulmonary Critical Care Medicine  [facility] - ICU

## 2023-09-18 LAB
ALBUMIN SERPL-MCNC: 3.6 G/DL (ref 3.5–5)
ALBUMIN/GLOB SERPL: 1.3 RATIO (ref 1.1–2)
ALP SERPL-CCNC: 47 UNIT/L (ref 40–150)
ALT SERPL-CCNC: 5 UNIT/L (ref 0–55)
AST SERPL-CCNC: 11 UNIT/L (ref 5–34)
BASOPHILS # BLD AUTO: 0.02 X10(3)/MCL
BASOPHILS NFR BLD AUTO: 0.3 %
BILIRUB SERPL-MCNC: 0.6 MG/DL
BUN SERPL-MCNC: 9 MG/DL (ref 7–18.7)
CALCIUM SERPL-MCNC: 8.9 MG/DL (ref 8.4–10.2)
CHLORIDE SERPL-SCNC: 108 MMOL/L (ref 98–107)
CO2 SERPL-SCNC: 25 MMOL/L (ref 22–29)
CREAT SERPL-MCNC: 0.72 MG/DL (ref 0.55–1.02)
EOSINOPHIL # BLD AUTO: 0.15 X10(3)/MCL (ref 0–0.9)
EOSINOPHIL NFR BLD AUTO: 2 %
ERYTHROCYTE [DISTWIDTH] IN BLOOD BY AUTOMATED COUNT: 11.7 % (ref 11.5–17)
GFR SERPLBLD CREATININE-BSD FMLA CKD-EPI: >60 MLS/MIN/1.73/M2
GLOBULIN SER-MCNC: 2.8 GM/DL (ref 2.4–3.5)
GLUCOSE SERPL-MCNC: 216 MG/DL (ref 74–100)
HCT VFR BLD AUTO: 35.4 % (ref 37–47)
HGB BLD-MCNC: 12.1 G/DL (ref 12–16)
IMM GRANULOCYTES # BLD AUTO: 0.01 X10(3)/MCL (ref 0–0.04)
IMM GRANULOCYTES NFR BLD AUTO: 0.1 %
LYMPHOCYTES # BLD AUTO: 2.73 X10(3)/MCL (ref 0.6–4.6)
LYMPHOCYTES NFR BLD AUTO: 36.5 %
MCH RBC QN AUTO: 29.5 PG (ref 27–31)
MCHC RBC AUTO-ENTMCNC: 34.2 G/DL (ref 33–36)
MCV RBC AUTO: 86.3 FL (ref 80–94)
MONOCYTES # BLD AUTO: 0.64 X10(3)/MCL (ref 0.1–1.3)
MONOCYTES NFR BLD AUTO: 8.6 %
NEUTROPHILS # BLD AUTO: 3.92 X10(3)/MCL (ref 2.1–9.2)
NEUTROPHILS NFR BLD AUTO: 52.5 %
NRBC BLD AUTO-RTO: 0 %
PLATELET # BLD AUTO: 241 X10(3)/MCL (ref 130–400)
PMV BLD AUTO: 9.7 FL (ref 7.4–10.4)
POCT GLUCOSE: 105 MG/DL (ref 70–110)
POCT GLUCOSE: 123 MG/DL (ref 70–110)
POCT GLUCOSE: 144 MG/DL (ref 70–110)
POCT GLUCOSE: 162 MG/DL (ref 70–110)
POCT GLUCOSE: 87 MG/DL (ref 70–110)
POTASSIUM SERPL-SCNC: 3.7 MMOL/L (ref 3.5–5.1)
PROT SERPL-MCNC: 6.4 GM/DL (ref 6.4–8.3)
RBC # BLD AUTO: 4.1 X10(6)/MCL (ref 4.2–5.4)
SODIUM SERPL-SCNC: 139 MMOL/L (ref 136–145)
WBC # SPEC AUTO: 7.47 X10(3)/MCL (ref 4.5–11.5)

## 2023-09-18 PROCEDURE — 25000003 PHARM REV CODE 250: Performed by: STUDENT IN AN ORGANIZED HEALTH CARE EDUCATION/TRAINING PROGRAM

## 2023-09-18 PROCEDURE — 63600175 PHARM REV CODE 636 W HCPCS: Performed by: STUDENT IN AN ORGANIZED HEALTH CARE EDUCATION/TRAINING PROGRAM

## 2023-09-18 PROCEDURE — 99232 PR SUBSEQUENT HOSPITAL CARE,LEVL II: ICD-10-PCS | Mod: ,,, | Performed by: INTERNAL MEDICINE

## 2023-09-18 PROCEDURE — 99232 SBSQ HOSP IP/OBS MODERATE 35: CPT | Mod: ,,, | Performed by: INTERNAL MEDICINE

## 2023-09-18 PROCEDURE — 25000003 PHARM REV CODE 250

## 2023-09-18 PROCEDURE — 99223 1ST HOSP IP/OBS HIGH 75: CPT | Mod: ,,, | Performed by: STUDENT IN AN ORGANIZED HEALTH CARE EDUCATION/TRAINING PROGRAM

## 2023-09-18 PROCEDURE — 80053 COMPREHEN METABOLIC PANEL: CPT | Performed by: STUDENT IN AN ORGANIZED HEALTH CARE EDUCATION/TRAINING PROGRAM

## 2023-09-18 PROCEDURE — 85025 COMPLETE CBC W/AUTO DIFF WBC: CPT | Performed by: STUDENT IN AN ORGANIZED HEALTH CARE EDUCATION/TRAINING PROGRAM

## 2023-09-18 PROCEDURE — 99223 PR INITIAL HOSPITAL CARE,LEVL III: ICD-10-PCS | Mod: ,,, | Performed by: STUDENT IN AN ORGANIZED HEALTH CARE EDUCATION/TRAINING PROGRAM

## 2023-09-18 PROCEDURE — 93005 ELECTROCARDIOGRAM TRACING: CPT

## 2023-09-18 PROCEDURE — 21400001 HC TELEMETRY ROOM

## 2023-09-18 PROCEDURE — 11000001 HC ACUTE MED/SURG PRIVATE ROOM

## 2023-09-18 RX ORDER — METOPROLOL TARTRATE 1 MG/ML
5 INJECTION, SOLUTION INTRAVENOUS ONCE
Status: COMPLETED | OUTPATIENT
Start: 2023-09-18 | End: 2023-09-18

## 2023-09-18 RX ORDER — QUETIAPINE FUMARATE 25 MG/1
50 TABLET, FILM COATED ORAL 2 TIMES DAILY
Status: DISCONTINUED | OUTPATIENT
Start: 2023-09-18 | End: 2023-09-22 | Stop reason: HOSPADM

## 2023-09-18 RX ORDER — METOPROLOL TARTRATE 1 MG/ML
5 INJECTION, SOLUTION INTRAVENOUS ONCE
Status: DISCONTINUED | OUTPATIENT
Start: 2023-09-18 | End: 2023-09-18

## 2023-09-18 RX ORDER — INSULIN ASPART 100 [IU]/ML
3 INJECTION, SOLUTION INTRAVENOUS; SUBCUTANEOUS
Status: DISCONTINUED | OUTPATIENT
Start: 2023-09-18 | End: 2023-09-20

## 2023-09-18 RX ORDER — METOPROLOL TARTRATE 1 MG/ML
5 INJECTION, SOLUTION INTRAVENOUS EVERY 5 MIN PRN
Status: DISCONTINUED | OUTPATIENT
Start: 2023-09-18 | End: 2023-09-18

## 2023-09-18 RX ORDER — METOPROLOL TARTRATE 1 MG/ML
5 INJECTION, SOLUTION INTRAVENOUS EVERY 5 MIN PRN
Status: DISCONTINUED | OUTPATIENT
Start: 2023-09-18 | End: 2023-09-22 | Stop reason: HOSPADM

## 2023-09-18 RX ADMIN — HYDROXYZINE PAMOATE 25 MG: 25 CAPSULE ORAL at 09:09

## 2023-09-18 RX ADMIN — QUETIAPINE FUMARATE 50 MG: 25 TABLET ORAL at 08:09

## 2023-09-18 RX ADMIN — INSULIN DETEMIR 5 UNITS: 100 INJECTION, SOLUTION SUBCUTANEOUS at 08:09

## 2023-09-18 RX ADMIN — SODIUM CHLORIDE: 9 INJECTION, SOLUTION INTRAVENOUS at 05:09

## 2023-09-18 RX ADMIN — INSULIN ASPART 5 UNITS: 100 INJECTION, SOLUTION INTRAVENOUS; SUBCUTANEOUS at 11:09

## 2023-09-18 RX ADMIN — HYDRALAZINE HYDROCHLORIDE 10 MG: 20 INJECTION INTRAMUSCULAR; INTRAVENOUS at 08:09

## 2023-09-18 RX ADMIN — METOPROLOL TARTRATE 5 MG: 1 INJECTION, SOLUTION INTRAVENOUS at 10:09

## 2023-09-18 RX ADMIN — INSULIN DETEMIR 10 UNITS: 100 INJECTION, SOLUTION SUBCUTANEOUS at 08:09

## 2023-09-18 RX ADMIN — INSULIN ASPART 5 UNITS: 100 INJECTION, SOLUTION INTRAVENOUS; SUBCUTANEOUS at 08:09

## 2023-09-18 RX ADMIN — QUETIAPINE FUMARATE 25 MG: 25 TABLET ORAL at 08:09

## 2023-09-18 RX ADMIN — INSULIN ASPART 3 UNITS: 100 INJECTION, SOLUTION INTRAVENOUS; SUBCUTANEOUS at 04:09

## 2023-09-18 NOTE — PROGRESS NOTES
"Inpatient Nutrition Evaluation    Admit Date: 2023   Total duration of encounter: 2 days    Nutrition Recommendation/Prescription     Continue diabetic diet   Pt education on diet/complete  MVI/fe  Biweekly wt  Will monitor nutrition status     Nutrition Assessment     Chart Review    Reason Seen: continuous nutrition monitoring    Malnutrition Screening Tool Results   Have you recently lost weight without trying?: No  Have you been eating poorly because of a decreased appetite?: Yes   MST Score: 1     Diagnosis:  DKA/DM, tachycardia, bipolar, cholelithiasis    Relevant Medical History: bipolar, DM, HTN     Nutrition-Related Medications: IVF @ 100ml/hr; insulin     Nutrition-Related Labs:  () H/H 12.1/35.4 Gluc 216(H) Bun 9.0 Cr 0.7 K 3.7 Alb 3.6     Diet Order: Diet diabetic  Oral Supplement Order: none  Appetite/Oral Intake: good/% of meals  Factors Affecting Nutritional Intake: none identified  Food/Scientology/Cultural Preferences: none reported  Food Allergies: none reported       Wound(s):   none reported    Comments    () Pt not answering all questions appropriately ; hx bipolar(??); stated eating ok /was not sure what she ate for breakfast; on low sugar diet at home; no recent wt loss reported. Encouraged pt to follow diabetic diet.     Anthropometrics    Height: 5' 7" (170.2 cm)    Last Weight: 66.4 kg (146 lb 6.2 oz) (23 1457) Weight Method: Bed Scale     BMI Classification: normal (BMI 18.5-24.9)     Ideal Body Weight (IBW), Female: 135 lb                          Usual Body Weight (UBW), k.4 kg (pt stated UBW between 146-150#)  % Usual Body Weight: 100.21     Usual Weight Provided By: patient and EMR weight history    Wt Readings from Last 5 Encounters:   23 66.4 kg (146 lb 6.2 oz)   23 68 kg (150 lb)   23 68 kg (150 lb)   22 68 kg (150 lb)   10/14/20 67.1 kg (147 lb 15.9 oz)     Weight Change(s) Since Admission:  Admit Weight: 66.4 kg (146 lb 6.2 oz) " (09/16/23 1289)  No wt loss reported     Patient Education    Education Provided: diabetic diet  Teaching Method: explanation and printed materials  Comprehension: verbalizes understanding  Barriers to Learning: difficulty concentrating  Expected Compliance: fair  Comments: All questions were answered and dietitian's contact information was provided.     Monitoring & Evaluation     Dietitian will monitor food and beverage intake, weight, and glucose/endocrine profile.  Nutrition Risk/Follow-Up: low (follow-up in 5-7 days)  Patients assigned 'low nutrition risk' status do not qualify for a full nutritional assessment but will be monitored and re-evaluated in a 5-7 day time period. Please consult if re-evaluation needed sooner.

## 2023-09-18 NOTE — ASSESSMENT & PLAN NOTE
ASSESSMENT     Bette Dangelo is a 33 y.o. female with a past psychiatric history of bipolar disorder, currently presenting with hyperglycemia. Psychiatry was originally consulted to address the patient's symptoms of depression.  Patient's presentation is consistent with diagnosis of bipolar disorder, mixed episode, with psychotic features.  Severe diabetes complicates management of her mental health.  Which strongly recommend voluntary admission to psychiatric hospital.  Would recommend to up titrate Seroquel while currently admitted but would benefit from continued medication management.  Patient apprehensive about readmission for psychiatric services, discussed that is options other than [facility] are available.  Patient voiced that she would think about the best option for her.    IMPRESSION  Bipolar disorder, mixed episode, with psychotic features    RECOMMENDATION(S)      1. Scheduled Medication(s):  Increase seroquel to 50mg bid    2. PRN Medication(s):  Recommend seroquel 100mg PO or zyprexa 10mg IM q8 hrs PRN non redirectable agitation associated with breakthrough psychosis or clara    3.  Monitor:  EKG on 9/16 w/ qtc 446    4. Legal Status/Precaution(s):  Defer PEC for now but low threshold to enact PEC     5. Disposition:  Recommend voluntary psychiatric admission when medically stable.  Consider [facility] vs [facility] vs [facility].  Recommend to enlist assistance from CM team.      6. Other:  Collateral would be helpful in this case.  Will reach out to pt's .

## 2023-09-18 NOTE — CONSULTS
[facility] Endocrinology Consult Note     Reason for Consult:      Insulin pump management    Subjective:     History of Present Illness:  Bette Dangelo is a 33 y.o. female who  has a past medical history of Diabetes mellitus type I, TMJ (dislocation of temporomandibular joint), and Vitamin D deficiency.. Endocrinology is being consulted for insulin pump management.    Patient presented to [facility] on 9/16/23 after being brought from [facility] for hyperglycemia. 6 days prior to presentation, patient was reportedly having issues with mood and anxiety and presented to [facility] for voluntary admision. She obtained medical clearance for admission from [facility] on 9/15/23; however, while at [facility], patient was not using insulin pump. It is unclear if facility did not want to use pump or if it was removed by patient. Patient does report issues with pump compliance for several years whenever her psychiatric illness is uncontrolled, and has been hospitalized for DKA in the past multiple times. Reports that she has not seen a psychiatrist in a while and has not been on any medications due to changes in insurance. Due to elevated CBGs while at [facility], she was brought to [facility] ED for further evaluation on 9/16/23 and was found to be in DKA. DKA protocol/insulin drip initiated, and once gap was closed and resolved, she was transitioned to subcutaneous insulin. Endocrinology is being consulted for insulin pump management, as patient would like to continue using her pump rather than injections    Review of Systems:  ROS negative except for HPI    Past Medical History: See above    Past Surgical History:  Past Surgical History:   Procedure Laterality Date    BACK SURGERY      1999       Allergies:  Review of patient's allergies indicates:  No Known Allergies    Home Medications:  Prior to Admission medications    Medication Sig Start Date End Date Taking? Authorizing Provider   cholecalciferol, vitamin D3, 1,250 mcg (50,000 unit)  capsule Take 50,000 Units by mouth every 7 days. 7/5/22   Provider, Historical   cholecalciferol, vitamin D3, 1,250 mcg (50,000 unit) capsule Take 50,000 Units by mouth. 5/2/22   Provider, Historical   clindamycin (CLEOCIN) 300 MG capsule Take by mouth. 7/5/22   Provider, Historical   clomiPHENE (CLOMID) 50 mg tablet Take by mouth. 6/20/22   Provider, Historical   famotidine (PEPCID) 20 MG tablet Take 1 tablet (20 mg total) by mouth 2 (two) times daily. 9/15/23 9/14/24  Oswald Blunt MD   fish oil/borage/flax/om3,6,9 1 (OMEGA 3-6-9 ORAL) Take by mouth.    Provider, Historical   FLOVENT  mcg/actuation inhaler Inhale 2 puffs into the lungs 2 (two) times daily. 8/30/22   Provider, Historical   fluticasone propionate (FLONASE) 50 mcg/actuation nasal spray 1 spray by Each Nostril route once daily. 1/19/22   Provider, Historical   glucagon (BAQSIMI) 3 mg/actuation Spry 3 mg by Nasal route daily as needed. 12/22/21   Provider, Historical   hydrOXYzine pamoate (VISTARIL) 25 MG Cap once as needed. 12/14/21   Provider, Historical   insulin aspart U-100 (NOVOLOG) 100 unit/mL (3 mL) InPn pen Novolog Flexpen U-100 Insulin Take No date recorded No form recorded No frequency recorded No route recorded No set duration recorded No set duration amount recorded active No dosage strength recorded No dosage strength units of measure recorded    Provider, Historical   insulin aspart U-100 (NOVOLOG) 100 unit/mL injection 300 units every 3 days per insulin pump 12/22/21   Provider, Historical   insulin glargine 100 units/mL SubQ pen Inject 16 Units into the skin. 12/22/21   Provider, Historical   meloxicam (MOBIC) 7.5 MG tablet Take 7.5 mg by mouth nightly. 6/22/22   Provider, Historical   meloxicam (MOBIC) 7.5 MG tablet Take 1 tablet by mouth every evening. 6/22/22   Provider, Historical   ondansetron (ZOFRAN) 8 MG tablet Take 8 mg by mouth every 8 (eight) hours as needed. 11/4/22   Provider, Historical   ondansetron  "(ZOFRAN-ODT) 4 MG TbDL Take 1 tablet (4 mg total) by mouth every 6 (six) hours as needed (Nausea). 9/15/23   Oswald Blunt MD       Family History:  Family History   Problem Relation Age of Onset    Mitral valve prolapse Mother     Other Father         mental concerns    Other Sister         heart issues    No Known Problems Brother     Breast cancer Maternal Grandmother        Social History:  Social History     Tobacco Use    Smoking status: Never    Smokeless tobacco: Never   Substance Use Topics    Alcohol use: Yes     Comment: Occasionally    Drug use: Never          Objective:   Vitals  Vitals  BP: 125/83  Temp: 98.6 °F (37 °C)  Temp Source: Oral  Pulse: (!) 114  Resp: 18  SpO2: 96 %  Height: 5' 7" (170.2 cm)  Weight: 66.4 kg (146 lb 6.2 oz)    Physical Examination:  Physical Exam  Constitutional:       General: She is not in acute distress.  HENT:      Head: Normocephalic and atraumatic.      Right Ear: External ear normal.      Left Ear: External ear normal.      Nose: Nose normal.      Mouth/Throat:      Pharynx: Oropharynx is clear.   Eyes:      Extraocular Movements: Extraocular movements intact.      Conjunctiva/sclera: Conjunctivae normal.      Pupils: Pupils are equal, round, and reactive to light.   Cardiovascular:      Rate and Rhythm: Tachycardia present.      Pulses: Normal pulses.      Heart sounds: Normal heart sounds.   Pulmonary:      Effort: Pulmonary effort is normal. No respiratory distress.      Breath sounds: Normal breath sounds.   Abdominal:      General: Abdomen is flat. Bowel sounds are normal.      Palpations: Abdomen is soft.   Musculoskeletal:         General: No swelling or deformity.      Cervical back: Normal range of motion and neck supple.   Skin:     General: Skin is warm and dry.   Neurological:      General: No focal deficit present.      Mental Status: She is alert. Mental status is at baseline.             Radiology:  No results found in the last 24 hours. "     Assessment & Plan:   Type 1 DM  Bipolar disorder  Cholelithiasis    -At this time, recommend continuing with subcutaneous insulin, as patient's disposition is pending psychiatry consult. Reassess transitioning to pump based on disposition  -To decrease risk of hypoglycemia, recommend continuing detemir 5 units in the AM  and decreasing evening dose of detemir to 10 units nightly. Recommend decreasing aspart from 5 units to 3 units TIDAC.  -Decrease correctional insulin as follows: 1 unit for -250, 2 units for -300, 3 units for 301-350, 4 units for >350  -Will continue to follow.    Hope Ho MD  [facility], PGY-3

## 2023-09-18 NOTE — PROGRESS NOTES
[facility] - ICU  Pulmonary Critical Care Note    Patient Name: Bette Dangelo  MRN: [id number]  Admission Date: 9/16/2023  Hospital Length of Stay: 2 days  Code Status: Full Code  Attending Provider: Dima Garber MD  Primary Care Provider: Sunshine Calderon FNP     Subjective:     HPI:   Pt is a 32yo F with PMH bipolar 1, T1DM, and HTN who presents to ED for control of her DM from [facility]. Reports feeling unwell over past few days. Trying to get to appt for outpatient cholecystectomy after finding +gallstones as cause of abd pain, nausea with meals. Has not been able to eat per usual. Not currently on any medications for bipolar disorder. Only on insulin pump for DM, unsure if pump has been working as intended. Was cleared by [facility] yesterday for admission [facility], however, CBG continued to worsen and was >350 today. Reports +anxiety, however, denies any fevers, chills. +polyuria and polydipsia; h/o dka in the past.     In ED, pt was AF, HR mildly elevated and s/p ativan 0.5mg. BOHB 5.0, UA with +ketones, +metabolic acidosis with AG 18. VBG with pH 7.4, insulin pump removed and placed with belongings.    Hospital Course/Significant events:  9/16/23 - admitted to ICU for DKA    24 Hour Interval History:  NAEON. Gap closed and DKA resolved. Pt transitioned with subQ insulin. Continues to have significant anxiety, states she's not well and wants help. At times says she feels better, but will also state she's not any better within same conversation. Talked about lack of self care at home and difficulty with cleaning house resulting in presence of bugs. Mentioned h/o trauma and possible PTSD however did not go into details. Per overnight nurse, pt had concerns of lice and requested physician to check. No lice found.     Past Medical History:   Diagnosis Date    Diabetes mellitus type I     TMJ (dislocation of temporomandibular joint)     Vitamin D deficiency        Past Surgical  History:   Procedure Laterality Date    BACK SURGERY      1999       Social History     Socioeconomic History    Marital status:    Tobacco Use    Smoking status: Never    Smokeless tobacco: Never   Substance and Sexual Activity    Alcohol use: Yes     Comment: Occasionally    Drug use: Never    Sexual activity: Yes     Social Determinants of Health     Financial Resource Strain: Low Risk  (9/18/2023)    Overall Financial Resource Strain (CARDIA)     Difficulty of Paying Living Expenses: Not hard at all   Food Insecurity: No Food Insecurity (9/18/2023)    Hunger Vital Sign     Worried About Running Out of Food in the Last Year: Never true     Ran Out of Food in the Last Year: Never true   Transportation Needs: No Transportation Needs (9/18/2023)    PRAPARE - Transportation     Lack of Transportation (Medical): No     Lack of Transportation (Non-Medical): No   Stress: Stress Concern Present (9/18/2023)    [employer] [place] [employer] - Occupational Stress Questionnaire     Feeling of Stress : To some extent   Social Connections: Unknown (9/18/2023)    Social Connection and Isolation Panel [NHANES]     Frequency of Communication with Friends and Family: More than three times a week     Frequency of Social Gatherings with Friends and Family: More than three times a week     Attends Club or Organization Meetings: Never     Marital Status:    Housing Stability: Low Risk  (9/18/2023)    Housing Stability Vital Sign     Unable to Pay for Housing in the Last Year: No     Number of Places Lived in the Last Year: 1     Unstable Housing in the Last Year: No           Current Outpatient Medications   Medication Instructions    BAQSIMI 3 mg, Nasal, Daily PRN    cholecalciferol (vitamin D3) 50,000 Units, Oral, Every 7 days    cholecalciferol (vitamin D3) 50,000 Units, Oral    clindamycin (CLEOCIN) 300 MG capsule Oral    clomiPHENE (CLOMID) 50 mg tablet Oral    famotidine (PEPCID) 20 mg, Oral, 2 times  daily    fish oil/borage/flax/om3,6,9 1 (OMEGA 3-6-9 ORAL) Oral    FLOVENT  mcg/actuation inhaler 2 puffs, Inhalation, 2 times daily    fluticasone propionate (FLONASE) 50 mcg/actuation nasal spray 1 spray, Each Nostril, Daily    hydrOXYzine pamoate (VISTARIL) 25 MG Cap Once as needed    insulin aspart U-100 (NOVOLOG) 100 unit/mL (3 mL) InPn pen Novolog Flexpen U-100 Insulin Take No date recorded No form recorded No frequency recorded No route recorded No set duration recorded No set duration amount recorded active No dosage strength recorded No dosage strength units of measure recorded    insulin aspart U-100 (NOVOLOG) 100 unit/mL injection 300 units every 3 days per insulin pump    insulin 16 Units, Subcutaneous    meloxicam (MOBIC) 7.5 MG tablet 1 tablet, Oral, Nightly    meloxicam (MOBIC) 7.5 mg, Oral, Nightly    ondansetron (ZOFRAN) 8 mg, Oral, Every 8 hours PRN    ondansetron (ZOFRAN-ODT) 4 mg, Oral, Every 6 hours PRN       Current Inpatient Medications   enoxparin  40 mg Subcutaneous Daily    insulin aspart U-100  5 Units Subcutaneous TIDWM    insulin detemir U-100  15 Units Subcutaneous QHS    insulin detemir U-100  5 Units Subcutaneous Daily    QUEtiapine  25 mg Oral BID       Current Intravenous Infusions   sodium chloride 0.9% 100 mL/hr at 09/18/23 0639    dextrose 5 % and 0.45 % NaCl Stopped (09/16/23 2328)         ROS   See above hpi    Objective:       Intake/Output Summary (Last 24 hours) at 9/18/2023 1205  Last data filed at 9/18/2023 0639  Gross per 24 hour   Intake 2338.78 ml   Output 1300 ml   Net 1038.78 ml           Vital Signs (Most Recent):  Temp: 98.6 °F (37 °C) (09/18/23 0800)  Pulse: (!) 131 (09/18/23 0900)  Resp: 19 (09/18/23 0900)  BP: (!) 141/91 (09/18/23 0900)  SpO2: 95 % (09/18/23 0900)  Body mass index is 22.93 kg/m².  Weight: 66.4 kg (146 lb 6.2 oz) Vital Signs (24h Range):  Temp:  [98.4 °F (36.9 °C)-98.7 °F (37.1 °C)] 98.6 °F (37 °C)  Pulse:  [] 131  Resp:  [12-28]  19  SpO2:  [94 %-98 %] 95 %  BP: (111-154)/() 141/91     Physical Exam  Constitutional:       General: She is not in acute distress.     Appearance: She is normal weight. She is not toxic-appearing.      Comments: Appears tired, anxious   HENT:      Head: Normocephalic and atraumatic.      Nose: Nose normal.   Eyes:      Extraocular Movements: Extraocular movements intact.      Conjunctiva/sclera: Conjunctivae normal.   Cardiovascular:      Rate and Rhythm: Regular rhythm.      Pulses: Normal pulses.      Heart sounds: Normal heart sounds. No murmur heard.     Comments: HR wnl at rest and when asleep  Pulmonary:      Effort: Pulmonary effort is normal. No respiratory distress.      Breath sounds: Normal breath sounds. No wheezing or rales.   Abdominal:      General: Abdomen is flat. Bowel sounds are normal.      Palpations: Abdomen is soft.      Tenderness: There is abdominal tenderness.      Comments: Generalized tenderness, worse predominantly in RUQ   Musculoskeletal:         General: No swelling.      Cervical back: Neck supple.   Skin:     General: Skin is warm and dry.   Neurological:      General: No focal deficit present.   Psychiatric:         Behavior: Behavior normal.      Comments: +anxious           Lines/Drains/Airways       Peripheral Intravenous Line  Duration                  Peripheral IV - Single Lumen 09/16/23 1020 20 G Left;Posterior Hand 2 days                    Significant Labs:    Lab Results   Component Value Date    WBC 7.47 09/18/2023    HGB 12.1 09/18/2023    HCT 35.4 (L) 09/18/2023    MCV 86.3 09/18/2023     09/18/2023           BMP  Lab Results   Component Value Date     09/18/2023    K 3.7 09/18/2023    CHLORIDE 108 (H) 09/18/2023    CO2 25 09/18/2023    BUN 9.0 09/18/2023    CREATININE 0.72 09/18/2023    CALCIUM 8.9 09/18/2023    AGAP 7.0 09/17/2023    EGFRNONAA >60 11/01/2017         ABG  Recent Labs   Lab 09/16/23  1039   PH 7.400   PO2 198.0*   PCO2 25.0*   HCO3  15.5   POCBASEDEF -7.20       Significant Imaging:  I have reviewed the pertinent imaging within the past 24 hours.        Assessment/Plan:     Assessment  DKA (mild) in setting of T1DM - resolved  Sinus tachycardia  Bipolar 1 disorder  Cholelithiasis    Plan  Downgrade today.  Extensive discussion with pt regarding care. Pt ultimately feels need for continued care and does not feel well.  Open to evaluation with psych to receive appropriate treatment  CBGs improved, cont detemir 15 at night, start 5u qam as CBG starting to rise today. Will discuss with endocrinology regarding insulin pump.  Cont seroquel, ativan prn axiety, agitation  Abd US with +cholelithiasis without cholecystitis. Will cont to monitor for now. Consult surgery if sxs evolve or if new sxs develop    DVT Prophylaxis: lovenox  GI Prophylaxis: n/a     Ami Jeffries MD  Pulmonary Critical Care Medicine  [facility] - ICU
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
## 2023-09-18 NOTE — SUBJECTIVE & OBJECTIVE
Patient History               Medical as of 9/18/2023       Past Medical History       Diagnosis Date Comments Source    Diabetes mellitus type I -- -- Provider    TMJ (dislocation of temporomandibular joint) -- -- Provider    Vitamin D deficiency -- -- Provider                          Surgical as of 9/18/2023       Past Surgical History       Procedure Laterality Date Comments Source    BACK SURGERY -- -- 1999 Provider                          Family as of 9/18/2023       Problem Relation Name Age of Onset Comments Source    Mitral valve prolapse Mother -- -- -- Provider    Other Father -- -- mental concerns Provider    Other Sister -- -- heart issues Provider    No Known Problems Brother -- -- -- Provider    Breast cancer Maternal Grandmother -- -- -- Provider                  Tobacco Use as of 9/18/2023       Smoking Status Smoking Start Date Quit Date Current Packs/Day Average Packs/Day    Never -- -- --       Smokeless Status Smokeless Type Smokeless Quit Date    Never -- --      Source    --                  Alcohol Use as of 9/18/2023       Alcohol Use Drinks/Week Alcohol/Week Comments Source    Yes   -- Occasionally Provider                  Drug Use as of 9/18/2023       Drug Use Types Frequency Comments Source    Never -- -- -- Provider                  Sexual Activity as of 9/18/2023       Sexually Active Birth Control Partners Comments Source    Yes -- -- -- Provider                  Activities of Daily Living as of 9/18/2023    None               Social Documentation as of 9/18/2023    None               Occupational as of 9/18/2023    None               Socioeconomic as of 9/18/2023       Marital Status Spouse Name Number of Children Years Education Education Level Preferred Language Ethnicity Race Source     -- -- -- -- English Not  or /a White --                  Pertinent History       Question Response Comments    Lives with -- --    Place in Birth Order -- --    Lives in  -- --    Number of Siblings -- --    Raised by -- --    Legal Involvement -- --    Childhood Trauma -- --    Criminal History of -- --    Financial Status -- --    Highest Level of Education -- --    Does patient have access to a firearm? -- --     Service -- --    Primary Leisure Activity -- --    Spirituality -- --          Past Medical History:   Diagnosis Date    Diabetes mellitus type I     TMJ (dislocation of temporomandibular joint)     Vitamin D deficiency      Past Surgical History:   Procedure Laterality Date    BACK SURGERY      1999     Family History       Problem Relation (Age of Onset)    Breast cancer Maternal Grandmother    Mitral valve prolapse Mother    No Known Problems Brother    Other Father, Sister          Tobacco Use    Smoking status: Never    Smokeless tobacco: Never   Substance and Sexual Activity    Alcohol use: Yes     Comment: Occasionally    Drug use: Never    Sexual activity: Yes     Review of patient's allergies indicates:  No Known Allergies    No current facility-administered medications on file prior to encounter.     Current Outpatient Medications on File Prior to Encounter   Medication Sig    cholecalciferol, vitamin D3, 1,250 mcg (50,000 unit) capsule Take 50,000 Units by mouth every 7 days.    cholecalciferol, vitamin D3, 1,250 mcg (50,000 unit) capsule Take 50,000 Units by mouth.    clindamycin (CLEOCIN) 300 MG capsule Take by mouth.    clomiPHENE (CLOMID) 50 mg tablet Take by mouth.    famotidine (PEPCID) 20 MG tablet Take 1 tablet (20 mg total) by mouth 2 (two) times daily.    fish oil/borage/flax/om3,6,9 1 (OMEGA 3-6-9 ORAL) Take by mouth.    FLOVENT  mcg/actuation inhaler Inhale 2 puffs into the lungs 2 (two) times daily.    fluticasone propionate (FLONASE) 50 mcg/actuation nasal spray 1 spray by Each Nostril route once daily.    glucagon (BAQSIMI) 3 mg/actuation Spry 3 mg by Nasal route daily as needed.    hydrOXYzine pamoate (VISTARIL) 25 MG Cap once as  "needed.    insulin aspart U-100 (NOVOLOG) 100 unit/mL (3 mL) InPn pen Novolog Flexpen U-100 Insulin Take No date recorded No form recorded No frequency recorded No route recorded No set duration recorded No set duration amount recorded active No dosage strength recorded No dosage strength units of measure recorded    insulin aspart U-100 (NOVOLOG) 100 unit/mL injection 300 units every 3 days per insulin pump    insulin glargine 100 units/mL SubQ pen Inject 16 Units into the skin.    meloxicam (MOBIC) 7.5 MG tablet Take 7.5 mg by mouth nightly.    meloxicam (MOBIC) 7.5 MG tablet Take 1 tablet by mouth every evening.    ondansetron (ZOFRAN) 8 MG tablet Take 8 mg by mouth every 8 (eight) hours as needed.    ondansetron (ZOFRAN-ODT) 4 MG TbDL Take 1 tablet (4 mg total) by mouth every 6 (six) hours as needed (Nausea).     Psychotherapeutics (From admission, onward)      Start     Stop Route Frequency Ordered    09/17/23 2100  QUEtiapine tablet 25 mg         -- Oral 2 times daily 09/17/23 0825    09/16/23 1924  LORazepam injection 1 mg         -- IV Every 2 hours PRN 09/16/23 1824          Review of Systems  See HPI above.     Strengths and Liabilities: Strength: Patient is motivated for change., Strength: Patient has positive support network., Liability: Patient has poor health.    Objective:     Vital Signs (Most Recent):  Temp: 98.6 °F (37 °C) (09/18/23 0800)  Pulse: (!) 114 (09/18/23 1300)  Resp: 18 (09/18/23 1300)  BP: 125/83 (09/18/23 1300)  SpO2: 96 % (09/18/23 1300) Vital Signs (24h Range):  Temp:  [98.6 °F (37 °C)-98.7 °F (37.1 °C)] 98.6 °F (37 °C)  Pulse:  [] 114  Resp:  [12-26] 18  SpO2:  [92 %-98 %] 96 %  BP: (111-154)/() 125/83     Height: 5' 7" (170.2 cm)  Weight: 66.4 kg (146 lb 6.2 oz)  Body mass index is 22.93 kg/m².      Intake/Output Summary (Last 24 hours) at 9/18/2023 1552  Last data filed at 9/18/2023 1200  Gross per 24 hour   Intake 1862.12 ml   Output 300 ml   Net 1562.12 ml        " "  Physical Exam     Appearance: age appropriate, disheveled  Level of Consciousness: awake, alert  Behavior/Cooperation: restless and fidgety   Psychomotor: psychomotor agitation   Speech:  rapid, normal volume, monotone  Language: english, fluid  Orientation: grossly intact, person, place, situation, day of week, month of year, year  Attention Span/Concentration: intact to interview and spells "WORLD" forwards and backwards without error  Memory: Registers 3/3 objects, recalls 3/3 objects at 5 minutes without cuing  Mood: "depressed"  Affect: mood congruent and anxious-appearing  Thought Process: perseverative  Associations:  mostlyl appropriate  Thought Content: +paranoia, denies AVH, denies SI/HI  Fund of Knowledge: Aware of current events  Abstraction: able to abstract  Insight: fair  Judgment: fair     Significant Labs: All pertinent labs within the past 24 hours have been reviewed.    Significant Imaging: I have reviewed all pertinent imaging results/findings within the past 24 hours.  "

## 2023-09-18 NOTE — PLAN OF CARE
09/18/23 1051   Discharge Assessment   Assessment Type Discharge Planning Assessment   Confirmed/corrected address, phone number and insurance Yes   Confirmed Demographics Correct on Facesheet   Source of Information patient   When was your last doctors appointment?   (Sunshine Calderon)   Reason For Admission Dehydration   R41.0 Delirium  R00.0 Tachyarrhythmia  R11.2 Nausea and vomiting  E10.10 Diabetic ketoacidosis without coma associated with type 1 diabetes mellitus   People in Home spouse   Facility Arrived From: [facility]   Do you expect to return to your current living situation? Yes   Do you have help at home or someone to help you manage your care at home? Yes   Who are your caregiver(s) and their phone number(s)? Hans Dangelo (Spouse)   [phone]   Prior to hospitilization cognitive status: Unable to Assess   Current cognitive status: Alert/Oriented   Equipment Currently Used at Home none   Readmission within 30 days? No   Patient currently being followed by outpatient case management? No   Do you currently have service(s) that help you manage your care at home? No   Do you take prescription medications? Yes  ([facility])   Do you have prescription coverage? Yes   Coverage BC/BS; [facility] M/D   Do you have any problems affording any of your prescribed medications? No   Is the patient taking medications as prescribed? yes   Who is going to help you get home at discharge? Family   How do you get to doctors appointments? car, drives self   Are you on dialysis? No   DME Needed Upon Discharge  none   Discharge Plan discussed with: Patient   Transition of Care Barriers Mental illness   Discharge Plan A Home   Financial Resource Strain   How hard is it for you to pay for the very basics like food, housing, medical care, and heating? Not hard   Housing Stability   In the last 12 months, was there a time when you were not able to pay the mortgage or rent on time? N   In  the last 12 months, how many places have you lived? 1   In the last 12 months, was there a time when you did not have a steady place to sleep or slept in a shelter (including now)? N   Transportation Needs   In the past 12 months, has lack of transportation kept you from medical appointments or from getting medications? no   In the past 12 months, has lack of transportation kept you from meetings, work, or from getting things needed for daily living? No   Food Insecurity   Within the past 12 months, you worried that your food would run out before you got the money to buy more. Never true   Within the past 12 months, the food you bought just didn't last and you didn't have money to get more. Never true   Stress   Do you feel stress - tense, restless, nervous, or anxious, or unable to sleep at night because your mind is troubled all the time - these days? To some exte   Social Connections   In a typical week, how many times do you talk on the phone with family, friends, or neighbors? More than 3   How often do you get together with friends or relatives? More than 3   How often do you attend meetings of the clubs or organizations you belong to? Never   Are you , , , , never , or living with a partner?    Alcohol Use   Q1: How often do you have a drink containing alcohol? 2-4 pr month   Q2: How many drinks containing alcohol do you have on a typical day when you are drinking? 1 or 2   Q3: How often do you have six or more drinks on one occasion? 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  "
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
## 2023-09-18 NOTE — HOSPITAL COURSE
"9/19/2023  Pt calm and cooperative with interview.  Provides limited responses.  Notes feeling "ok" today.  Notes continued abdominal pain.  Feels safe in hospital today.  Denies hallucinations.  Slept poorly overnight.  Reports that she was told that a surgeon would meet with her to discuss gallbladder surgery.  No questions today.   "

## 2023-09-19 LAB
ALBUMIN SERPL-MCNC: 4 G/DL (ref 3.5–5)
ALBUMIN/GLOB SERPL: 1.3 RATIO (ref 1.1–2)
ALP SERPL-CCNC: 50 UNIT/L (ref 40–150)
ALT SERPL-CCNC: 7 UNIT/L (ref 0–55)
AST SERPL-CCNC: 13 UNIT/L (ref 5–34)
BASOPHILS # BLD AUTO: 0.02 X10(3)/MCL
BASOPHILS NFR BLD AUTO: 0.2 %
BILIRUB SERPL-MCNC: 0.5 MG/DL
BUN SERPL-MCNC: 5.4 MG/DL (ref 7–18.7)
CALCIUM SERPL-MCNC: 9.7 MG/DL (ref 8.4–10.2)
CHLORIDE SERPL-SCNC: 105 MMOL/L (ref 98–107)
CO2 SERPL-SCNC: 24 MMOL/L (ref 22–29)
CREAT SERPL-MCNC: 0.65 MG/DL (ref 0.55–1.02)
EOSINOPHIL # BLD AUTO: 0.1 X10(3)/MCL (ref 0–0.9)
EOSINOPHIL NFR BLD AUTO: 1.2 %
ERYTHROCYTE [DISTWIDTH] IN BLOOD BY AUTOMATED COUNT: 11.7 % (ref 11.5–17)
GFR SERPLBLD CREATININE-BSD FMLA CKD-EPI: >60 MLS/MIN/1.73/M2
GLOBULIN SER-MCNC: 3.2 GM/DL (ref 2.4–3.5)
GLUCOSE SERPL-MCNC: 179 MG/DL (ref 74–100)
HCT VFR BLD AUTO: 37.6 % (ref 37–47)
HGB BLD-MCNC: 12.9 G/DL (ref 12–16)
IMM GRANULOCYTES # BLD AUTO: 0.02 X10(3)/MCL (ref 0–0.04)
IMM GRANULOCYTES NFR BLD AUTO: 0.2 %
LYMPHOCYTES # BLD AUTO: 1.94 X10(3)/MCL (ref 0.6–4.6)
LYMPHOCYTES NFR BLD AUTO: 23 %
MCH RBC QN AUTO: 29.9 PG (ref 27–31)
MCHC RBC AUTO-ENTMCNC: 34.3 G/DL (ref 33–36)
MCV RBC AUTO: 87 FL (ref 80–94)
MONOCYTES # BLD AUTO: 0.59 X10(3)/MCL (ref 0.1–1.3)
MONOCYTES NFR BLD AUTO: 7 %
NEUTROPHILS # BLD AUTO: 5.77 X10(3)/MCL (ref 2.1–9.2)
NEUTROPHILS NFR BLD AUTO: 68.4 %
NRBC BLD AUTO-RTO: 0 %
PLATELET # BLD AUTO: 267 X10(3)/MCL (ref 130–400)
PMV BLD AUTO: 9.8 FL (ref 7.4–10.4)
POCT GLUCOSE: 176 MG/DL (ref 70–110)
POCT GLUCOSE: 183 MG/DL (ref 70–110)
POCT GLUCOSE: 211 MG/DL (ref 70–110)
POCT GLUCOSE: 226 MG/DL (ref 70–110)
POCT GLUCOSE: 278 MG/DL (ref 70–110)
POTASSIUM SERPL-SCNC: 3.8 MMOL/L (ref 3.5–5.1)
PROT SERPL-MCNC: 7.2 GM/DL (ref 6.4–8.3)
RBC # BLD AUTO: 4.32 X10(6)/MCL (ref 4.2–5.4)
SODIUM SERPL-SCNC: 138 MMOL/L (ref 136–145)
WBC # SPEC AUTO: 8.44 X10(3)/MCL (ref 4.5–11.5)

## 2023-09-19 PROCEDURE — 99231 SBSQ HOSP IP/OBS SF/LOW 25: CPT | Mod: ,,, | Performed by: STUDENT IN AN ORGANIZED HEALTH CARE EDUCATION/TRAINING PROGRAM

## 2023-09-19 PROCEDURE — 25000003 PHARM REV CODE 250: Performed by: STUDENT IN AN ORGANIZED HEALTH CARE EDUCATION/TRAINING PROGRAM

## 2023-09-19 PROCEDURE — 25000003 PHARM REV CODE 250

## 2023-09-19 PROCEDURE — 63600175 PHARM REV CODE 636 W HCPCS: Performed by: STUDENT IN AN ORGANIZED HEALTH CARE EDUCATION/TRAINING PROGRAM

## 2023-09-19 PROCEDURE — 99231 PR SUBSEQUENT HOSPITAL CARE,LEVL I: ICD-10-PCS | Mod: ,,, | Performed by: STUDENT IN AN ORGANIZED HEALTH CARE EDUCATION/TRAINING PROGRAM

## 2023-09-19 PROCEDURE — 80053 COMPREHEN METABOLIC PANEL: CPT | Performed by: STUDENT IN AN ORGANIZED HEALTH CARE EDUCATION/TRAINING PROGRAM

## 2023-09-19 PROCEDURE — 85025 COMPLETE CBC W/AUTO DIFF WBC: CPT | Performed by: STUDENT IN AN ORGANIZED HEALTH CARE EDUCATION/TRAINING PROGRAM

## 2023-09-19 PROCEDURE — 21400001 HC TELEMETRY ROOM

## 2023-09-19 RX ORDER — TALC
6 POWDER (GRAM) TOPICAL NIGHTLY PRN
Status: DISCONTINUED | OUTPATIENT
Start: 2023-09-19 | End: 2023-09-22 | Stop reason: HOSPADM

## 2023-09-19 RX ORDER — ALPRAZOLAM 0.25 MG/1
0.25 TABLET ORAL ONCE AS NEEDED
Status: DISCONTINUED | OUTPATIENT
Start: 2023-09-19 | End: 2023-09-20

## 2023-09-19 RX ADMIN — QUETIAPINE FUMARATE 50 MG: 25 TABLET ORAL at 08:09

## 2023-09-19 RX ADMIN — INSULIN DETEMIR 5 UNITS: 100 INJECTION, SOLUTION SUBCUTANEOUS at 08:09

## 2023-09-19 RX ADMIN — INSULIN ASPART 3 UNITS: 100 INJECTION, SOLUTION INTRAVENOUS; SUBCUTANEOUS at 11:09

## 2023-09-19 RX ADMIN — SODIUM CHLORIDE: 9 INJECTION, SOLUTION INTRAVENOUS at 02:09

## 2023-09-19 RX ADMIN — INSULIN ASPART 3 UNITS: 100 INJECTION, SOLUTION INTRAVENOUS; SUBCUTANEOUS at 08:09

## 2023-09-19 RX ADMIN — QUETIAPINE FUMARATE 50 MG: 25 TABLET ORAL at 09:09

## 2023-09-19 RX ADMIN — INSULIN DETEMIR 10 UNITS: 100 INJECTION, SOLUTION SUBCUTANEOUS at 09:09

## 2023-09-19 RX ADMIN — METOPROLOL SUCCINATE 12.5 MG: 25 TABLET, EXTENDED RELEASE ORAL at 05:09

## 2023-09-19 RX ADMIN — INSULIN ASPART 3 UNITS: 100 INJECTION, SOLUTION INTRAVENOUS; SUBCUTANEOUS at 04:09

## 2023-09-19 RX ADMIN — INSULIN ASPART 1 UNITS: 100 INJECTION, SOLUTION INTRAVENOUS; SUBCUTANEOUS at 05:09

## 2023-09-19 RX ADMIN — INSULIN ASPART 2 UNITS: 100 INJECTION, SOLUTION INTRAVENOUS; SUBCUTANEOUS at 11:09

## 2023-09-19 RX ADMIN — SODIUM CHLORIDE: 9 INJECTION, SOLUTION INTRAVENOUS at 04:09

## 2023-09-19 RX ADMIN — METOPROLOL TARTRATE 5 MG: 1 INJECTION, SOLUTION INTRAVENOUS at 12:09

## 2023-09-19 NOTE — PROGRESS NOTES
[facility] - ICU  Pulmonary Critical Care Note    Patient Name: Bette Dangelo  MRN: [id number]  Admission Date: 9/16/2023  Hospital Length of Stay: 3 days  Code Status: Full Code  Attending Provider: Obie Washington MD  Primary Care Provider: Sunshine Calderon FNP     Subjective:     HPI:   Pt is a 32yo F with PMH bipolar 1, T1DM, and HTN who presents to ED for control of her DM from [facility]. Reports feeling unwell over past few days. Trying to get to appt for outpatient cholecystectomy after finding +gallstones as cause of abd pain, nausea with meals. Has not been able to eat per usual. Not currently on any medications for bipolar disorder. Only on insulin pump for DM, unsure if pump has been working as intended. Was cleared by [facility] yesterday for admission to [facility], however, CBG continued to worsen and was >350 today. Reports +anxiety, however, denies any fevers, chills. +polyuria and polydipsia; h/o dka in the past.     In ED, pt was AF, HR mildly elevated and s/p ativan 0.5mg. BOHB 5.0, UA with +ketones, +metabolic acidosis with AG 18. VBG with pH 7.4, insulin pump removed and placed with belongings.    Hospital Course/Significant events:  9/16/23 - admitted to ICU for DKA  9/18/23 - downgraded to floor    24 Hour Interval History:  Patient with no acute events overnight. Talked to patient this AM about inpatient psychiatric placement which patient was initially amenable to. However, later in the day, patient and  were refusing inpatient psych placement at this time due to concern for need for gallbladder evaluation. Patient continues to complain of abdominal pain. US abdomen on admission showed gallstones though without evidence of obstruction or infection. Psych evaluated patient yesterday with low threshhold to PEC patient at this time.    Past Medical History:   Diagnosis Date    Diabetes mellitus type I     TMJ (dislocation of temporomandibular joint)     Vitamin D  deficiency        Past Surgical History:   Procedure Laterality Date    BACK SURGERY      1999       Social History     Socioeconomic History    Marital status:    Tobacco Use    Smoking status: Never    Smokeless tobacco: Never   Substance and Sexual Activity    Alcohol use: Yes     Comment: Occasionally    Drug use: Never    Sexual activity: Yes     Social Determinants of Health     Financial Resource Strain: Low Risk  (9/18/2023)    Overall Financial Resource Strain (CARDIA)     Difficulty of Paying Living Expenses: Not hard at all   Food Insecurity: No Food Insecurity (9/18/2023)    Hunger Vital Sign     Worried About Running Out of Food in the Last Year: Never true     Ran Out of Food in the Last Year: Never true   Transportation Needs: No Transportation Needs (9/18/2023)    PRAPARE - Transportation     Lack of Transportation (Medical): No     Lack of Transportation (Non-Medical): No   Stress: Stress Concern Present (9/18/2023)    [place] [employer] [place] [employer] - Occupational Stress Questionnaire     Feeling of Stress : To some extent   Social Connections: Unknown (9/18/2023)    Social Connection and Isolation Panel [NHANES]     Frequency of Communication with Friends and Family: More than three times a week     Frequency of Social Gatherings with Friends and Family: More than three times a week     Attends Club or Organization Meetings: Never     Marital Status:    Housing Stability: Low Risk  (9/18/2023)    Housing Stability Vital Sign     Unable to Pay for Housing in the Last Year: No     Number of Places Lived in the Last Year: 1     Unstable Housing in the Last Year: No           Current Outpatient Medications   Medication Instructions    BAQSIMI 3 mg, Nasal, Daily PRN    cholecalciferol (vitamin D3) 50,000 Units, Oral, Every 7 days    cholecalciferol (vitamin D3) 50,000 Units, Oral    clindamycin (CLEOCIN) 300 MG capsule Oral    clomiPHENE (CLOMID) 50 mg tablet Oral    famotidine  (PEPCID) 20 mg, Oral, 2 times daily    fish oil/borage/flax/om3,6,9 1 (OMEGA 3-6-9 ORAL) Oral    FLOVENT  mcg/actuation inhaler 2 puffs, Inhalation, 2 times daily    fluticasone propionate (FLONASE) 50 mcg/actuation nasal spray 1 spray, Each Nostril, Daily    hydrOXYzine pamoate (VISTARIL) 25 MG Cap Once as needed    insulin aspart U-100 (NOVOLOG) 100 unit/mL (3 mL) InPn pen Novolog Flexpen U-100 Insulin Take No date recorded No form recorded No frequency recorded No route recorded No set duration recorded No set duration amount recorded active No dosage strength recorded No dosage strength units of measure recorded    insulin aspart U-100 (NOVOLOG) 100 unit/mL injection 300 units every 3 days per insulin pump    insulin 16 Units, Subcutaneous    meloxicam (MOBIC) 7.5 MG tablet 1 tablet, Oral, Nightly    meloxicam (MOBIC) 7.5 mg, Oral, Nightly    ondansetron (ZOFRAN) 8 mg, Oral, Every 8 hours PRN    ondansetron (ZOFRAN-ODT) 4 mg, Oral, Every 6 hours PRN       Current Inpatient Medications   enoxparin  40 mg Subcutaneous Daily    insulin aspart U-100  3 Units Subcutaneous TIDWM    insulin detemir U-100  10 Units Subcutaneous QHS    insulin detemir U-100  5 Units Subcutaneous Daily    QUEtiapine  50 mg Oral BID       Current Intravenous Infusions   sodium chloride 0.9% 100 mL/hr at 09/19/23 0446    dextrose 5 % and 0.45 % NaCl Stopped (09/16/23 2328)         ROS   See above hpi    Objective:       Intake/Output Summary (Last 24 hours) at 9/19/2023 0632  Last data filed at 9/18/2023 1841  Gross per 24 hour   Intake 3032.18 ml   Output --   Net 3032.18 ml           Vital Signs (Most Recent):  Temp: 97.8 °F (36.6 °C) (09/19/23 0359)  Pulse: (!) 119 (09/19/23 0359)  Resp: 15 (09/18/23 1600)  BP: (!) 150/97 (09/19/23 0359)  SpO2: 100 % (09/19/23 0359)  Body mass index is 22.93 kg/m².  Weight: 66.4 kg (146 lb 6.2 oz) Vital Signs (24h Range):  Temp:  [97.6 °F (36.4 °C)-98.8 °F (37.1 °C)] 97.8 °F (36.6 °C)  Pulse:   [] 119  Resp:  [15-28] 15  SpO2:  [92 %-100 %] 100 %  BP: (121-158)/() 150/97     Physical Exam  Constitutional:       General: She is not in acute distress.     Appearance: She is normal weight. She is not toxic-appearing.      Comments: Appears tired, anxious   HENT:      Head: Normocephalic and atraumatic.      Nose: Nose normal.   Eyes:      Extraocular Movements: Extraocular movements intact.      Conjunctiva/sclera: Conjunctivae normal.   Cardiovascular:      Rate and Rhythm: Regular rhythm.      Pulses: Normal pulses.      Heart sounds: Normal heart sounds. No murmur heard.     Comments: HR wnl at rest and when asleep  Pulmonary:      Effort: Pulmonary effort is normal. No respiratory distress.      Breath sounds: Normal breath sounds. No wheezing or rales.   Abdominal:      General: Abdomen is flat. Bowel sounds are normal.      Palpations: Abdomen is soft.      Tenderness: There is abdominal tenderness.      Comments: Generalized tenderness, worse predominantly in RUQ   Musculoskeletal:         General: No swelling.      Cervical back: Neck supple.   Skin:     General: Skin is warm and dry.   Neurological:      General: No focal deficit present.   Psychiatric:         Behavior: Behavior normal.      Comments: +anxious           Lines/Drains/Airways       Peripheral Intravenous Line  Duration                  Peripheral IV - Single Lumen 09/16/23 1020 20 G Left;Posterior Hand 2 days                    Significant Labs:    Lab Results   Component Value Date    WBC 8.44 09/19/2023    HGB 12.9 09/19/2023    HCT 37.6 09/19/2023    MCV 87.0 09/19/2023     09/19/2023           BMP  Lab Results   Component Value Date     09/19/2023    K 3.8 09/19/2023    CHLORIDE 105 09/19/2023    CO2 24 09/19/2023    BUN 5.4 (L) 09/19/2023    CREATININE 0.65 09/19/2023    CALCIUM 9.7 09/19/2023    AGAP 7.0 09/17/2023    EGFRNONAA >60 11/01/2017         ABG  Recent Labs   Lab 09/16/23  1039   PH 7.400   PO2  198.0*   PCO2 25.0*   HCO3 15.5   POCBASEDEF -7.20       Significant Imaging:  I have reviewed the pertinent imaging within the past 24 hours.        Assessment/Plan:     Assessment  DKA (mild) in setting of T1DM - resolved  Sinus tachycardia  Bipolar 1 disorder  Cholelithiasis    Plan  Downgrade to floor  Extensive discussion with pt regarding care. Pt ultimately feels need for continued care and does not feel well.    Evaluated by Psychiatry who defer PEC for time being but 'low threshold to PEC'  Endocrinology consulted for insulin pump; currently regimen include Detemir 5 units AM, Detemir 10 units PM, Aspart 3 units with meals along with modified sliding scale (1 unit for -250, 2 units for -300, 3 units for 301-350, 4 units for >350)  Cont seroquel, ativan prn axiety, agitation  Abd US with +cholelithiasis without cholecystitis. Will cont to monitor for now. Consult surgery if sxs evolve or if new sxs develop  Patient will need inpatient Psychiatric placement at this time but fixated on surgical evaluation for gallstones; will discuss with patient and spouse today    DVT Prophylaxis: lovenox  GI Prophylaxis: n/a     Samuel Jeffries MD  Pulmonary Critical Care Medicine  [facility] - ICU

## 2023-09-19 NOTE — ASSESSMENT & PLAN NOTE
ASSESSMENT     Bette Dangelo is a 33 y.o. female with a past psychiatric history of bipolar disorder, currently presenting with hyperglycemia. Psychiatry was originally consulted to address the patient's symptoms of depression.  Patient's presentation is consistent with diagnosis of bipolar disorder, mixed episode, with psychotic features.  Severe diabetes complicates management of her mental health.  Strongly recommend voluntary admission to psychiatric hospital.  Primary team looking into admission to [facility].  See below for updated recommendations.     IMPRESSION  Bipolar disorder, mixed episode, with psychotic features    RECOMMENDATION(S)      1. Scheduled Medication(s):  Continue seroquel 50mg bid    2. PRN Medication(s):  Recommend seroquel 100mg PO or zyprexa 10mg IM q8 hrs PRN non redirectable agitation associated with breakthrough psychosis or clara    3.  Monitor:  EKG on 9/16 w/ qtc 446    4. Legal Status/Precaution(s):  Defer PEC for now but low threshold to enact PEC     5. Disposition:  Recommend voluntary psychiatric admission when medically stable.  Agree with seeking admission to [facility].

## 2023-09-19 NOTE — CONSULTS
[facility] Endocrinology Consult Note     Reason for Consult:      Insulin pump management    Subjective:     History of Present Illness:  Bette Dangelo is a 33 y.o. female who  has a past medical history of Diabetes mellitus type I, TMJ (dislocation of temporomandibular joint), and Vitamin D deficiency.. Endocrinology is being consulted for insulin pump management.    Patient presented to [facility] on 9/16/23 after being brought from [facility] for hyperglycemia. 6 days prior to presentation, patient was reportedly having issues with mood and anxiety and presented to [facility] for voluntary admision. She obtained medical clearance for admission from [facility] on 9/15/23; however, while at [facility], patient was not using insulin pump. It is unclear if facility did not want to use pump or if it was removed by patient. Patient does report issues with pump compliance for several years whenever her psychiatric illness is uncontrolled, and has been hospitalized for DKA in the past multiple times. Reports that she has not seen a psychiatrist in a while and has not been on any medications due to changes in insurance. Due to elevated CBGs while at [facility], she was brought to [facility] ED for further evaluation on 9/16/23 and was found to be in DKA. DKA protocol/insulin drip initiated, and once gap was closed and resolved, she was transitioned to subcutaneous insulin. Endocrinology is being consulted for insulin pump management, as patient would like to continue using her pump rather than injections    9/19/23: Today, patient reports feeling tired; drowsy but arousable and answers questions appropriately during interview. Had issues with anxiety and tachycardia overnight. Did have CBG of 87 yesterday which improved to 105 on recheck. Sugars improved although slightly elevated since adjustment of insulin regimen with less risk of hypoglycemia    Review of Systems:  ROS negative except for HPI    Past Medical History: See above    Past  Surgical History:  Past Surgical History:   Procedure Laterality Date    BACK SURGERY      1999       Allergies:  Review of patient's allergies indicates:  No Known Allergies    Home Medications:  Prior to Admission medications    Medication Sig Start Date End Date Taking? Authorizing Provider   cholecalciferol, vitamin D3, 1,250 mcg (50,000 unit) capsule Take 50,000 Units by mouth every 7 days. 7/5/22   Provider, Historical   cholecalciferol, vitamin D3, 1,250 mcg (50,000 unit) capsule Take 50,000 Units by mouth. 5/2/22   Provider, Historical   clindamycin (CLEOCIN) 300 MG capsule Take by mouth. 7/5/22   Provider, Historical   clomiPHENE (CLOMID) 50 mg tablet Take by mouth. 6/20/22   Provider, Historical   famotidine (PEPCID) 20 MG tablet Take 1 tablet (20 mg total) by mouth 2 (two) times daily. 9/15/23 9/14/24  Oswald Blunt MD   fish oil/borage/flax/om3,6,9 1 (OMEGA 3-6-9 ORAL) Take by mouth.    Provider, Historical   FLOVENT  mcg/actuation inhaler Inhale 2 puffs into the lungs 2 (two) times daily. 8/30/22   Provider, Historical   fluticasone propionate (FLONASE) 50 mcg/actuation nasal spray 1 spray by Each Nostril route once daily. 1/19/22   Provider, Historical   glucagon (BAQSIMI) 3 mg/actuation Spry 3 mg by Nasal route daily as needed. 12/22/21   Provider, Historical   hydrOXYzine pamoate (VISTARIL) 25 MG Cap once as needed. 12/14/21   Provider, Historical   insulin aspart U-100 (NOVOLOG) 100 unit/mL (3 mL) InPn pen Novolog Flexpen U-100 Insulin Take No date recorded No form recorded No frequency recorded No route recorded No set duration recorded No set duration amount recorded active No dosage strength recorded No dosage strength units of measure recorded    Provider, Historical   insulin aspart U-100 (NOVOLOG) 100 unit/mL injection 300 units every 3 days per insulin pump 12/22/21   Provider, Historical   insulin glargine 100 units/mL SubQ pen Inject 16 Units into the skin. 12/22/21    "Provider, Historical   meloxicam (MOBIC) 7.5 MG tablet Take 7.5 mg by mouth nightly. 6/22/22   Provider, Historical   meloxicam (MOBIC) 7.5 MG tablet Take 1 tablet by mouth every evening. 6/22/22   Provider, Historical   ondansetron (ZOFRAN) 8 MG tablet Take 8 mg by mouth every 8 (eight) hours as needed. 11/4/22   Provider, Historical   ondansetron (ZOFRAN-ODT) 4 MG TbDL Take 1 tablet (4 mg total) by mouth every 6 (six) hours as needed (Nausea). 9/15/23   Oswald Blunt MD       Family History:  Family History   Problem Relation Age of Onset    Mitral valve prolapse Mother     Other Father         mental concerns    Other Sister         heart issues    No Known Problems Brother     Breast cancer Maternal Grandmother        Social History:  Social History     Tobacco Use    Smoking status: Never    Smokeless tobacco: Never   Substance Use Topics    Alcohol use: Yes     Comment: Occasionally    Drug use: Never          Objective:   Vitals  Vitals  BP: (!) 145/95  Temp: 98.4 °F (36.9 °C)  Temp Source: Oral  Pulse: (!) 121  Resp: 15  SpO2: 98 %  Height: 5' 7" (170.2 cm)  Weight: 66.4 kg (146 lb 6.2 oz)    Physical Examination:  Physical Exam  Constitutional:       General: She is not in acute distress.  HENT:      Head: Normocephalic and atraumatic.      Right Ear: External ear normal.      Left Ear: External ear normal.      Nose: Nose normal.      Mouth/Throat:      Pharynx: Oropharynx is clear.   Eyes:      Extraocular Movements: Extraocular movements intact.      Conjunctiva/sclera: Conjunctivae normal.      Pupils: Pupils are equal, round, and reactive to light.   Cardiovascular:      Rate and Rhythm: Tachycardia present.      Pulses: Normal pulses.      Heart sounds: Normal heart sounds.   Pulmonary:      Effort: Pulmonary effort is normal. No respiratory distress.      Breath sounds: Normal breath sounds.   Abdominal:      General: Abdomen is flat. Bowel sounds are normal.      Palpations: Abdomen is soft. "   Musculoskeletal:         General: No swelling or deformity.      Cervical back: Normal range of motion and neck supple.   Skin:     General: Skin is warm and dry.   Neurological:      General: No focal deficit present.      Mental Status: She is alert. Mental status is at baseline.             Radiology:  No results found in the last 24 hours.     Assessment & Plan:   Type 1 DM  Bipolar disorder  Cholelithiasis    -Will continue with current insulin regimen at this time, as patient will unlikely be able to manage insulin pump in her current state   -Continue current regimen of detemir 5 units in the AM, detemir 10 units nightly and 3 units aspart TIDAC.  -Continue  correctional insulin as follows: 1 unit for -250, 2 units for -300, 3 units for 301-350, 4 units for >350  -Will continue to follow.    Hope Ho MD  [facility], PGY-3
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
## 2023-09-19 NOTE — PLAN OF CARE
New psych placement consult, pt does not want to be placed at [facility], referral called in to [facility] ([phone]), spoke with Manisha, will begin looking for placement.

## 2023-09-19 NOTE — CARE UPDATE
I was called by nursing staff because patient was in sinus tachycardia with sustained HR in the 150's at 2200. I went to assess patient, she reported feeling anxious despite receiving Seroquel 50mg at 2054.   I ordered lopressor pushes with her heart rate responding with a decrease to 104 after 1 push. She also received her PRN vistaril at 2145.   Heart rate went back up to 143 at 2329. Will continue lopressor pushes q5mins PRN  for sustained HR >100.

## 2023-09-19 NOTE — PT/OT/SLP PROGRESS
Physical Therapy    Missed Treatment Session    Patient Name:  Bette Dangelo   MRN:  [id number]      Patient politely declined at this time.     Will follow-up as patient is available to participate and as therapists' schedule allows.

## 2023-09-19 NOTE — SUBJECTIVE & OBJECTIVE
"Interval History: See hospital course.     Family History       Problem Relation (Age of Onset)    Breast cancer Maternal Grandmother    Mitral valve prolapse Mother    No Known Problems Brother    Other Father, Sister          Tobacco Use    Smoking status: Never    Smokeless tobacco: Never   Substance and Sexual Activity    Alcohol use: Yes     Comment: Occasionally    Drug use: Never    Sexual activity: Yes     Psychotherapeutics (From admission, onward)      Start     Stop Route Frequency Ordered    09/19/23 1147  ALPRAZolam tablet 0.25 mg         -- Oral Once as needed 09/19/23 1147    09/18/23 2100  QUEtiapine tablet 50 mg         -- Oral 2 times daily 09/18/23 1732            Review of Systems  Negative for all systems except as reference above in hospital course vs HPI.     Objective:     Vital Signs (Most Recent):  Temp: 98.4 °F (36.9 °C) (09/19/23 1114)  Pulse: 103 (09/19/23 1459)  Resp: 15 (09/19/23 0948)  BP: 139/85 (09/19/23 1459)  SpO2: 96 % (09/19/23 1459) Vital Signs (24h Range):  Temp:  [97.6 °F (36.4 °C)-98.8 °F (37.1 °C)] 98.4 °F (36.9 °C)  Pulse:  [103-157] 103  Resp:  [15] 15  SpO2:  [96 %-100 %] 96 %  BP: (127-158)/() 139/85     Height: 5' 7" (170.2 cm)  Weight: 66.4 kg (146 lb 6.2 oz)  Body mass index is 22.93 kg/m².      Intake/Output Summary (Last 24 hours) at 9/19/2023 1739  Last data filed at 9/18/2023 1841  Gross per 24 hour   Intake 91.66 ml   Output --   Net 91.66 ml        Physical Exam     Appearance: unremarkable, age appropriate  Level of Consciousness: awake, alert  Behavior/Cooperation: reluctant to participate  Psychomotor: psychomotor retardation   Speech: slowed, soft  Language: english, fluid  Orientation: grossly intact  Attention Span/Concentration: intact to interview  Memory: Grossly intact  Mood: "ok"  Affect: constricted  Thought Process: linear, goal-directed  Associations: Logical and appropriate  Thought Content: denies SI/HI/paranoia, no delusional ideation " volunteered, denies plan or desire for self harm or harm to others  Fund of Knowledge: Intact  Abstraction: intact to interview  Insight: fair  Judgment: fair       Significant Labs: All pertinent labs within the past 24 hours have been reviewed.    Significant Imaging: I have reviewed all pertinent imaging results/findings within the past 24 hours.

## 2023-09-19 NOTE — PLAN OF CARE
Problem: Infection  Goal: Absence of Infection Signs and Symptoms  Outcome: Ongoing, Progressing     Problem: Adult Inpatient Plan of Care  Goal: Plan of Care Review  Outcome: Ongoing, Progressing  Goal: Patient-Specific Goal (Individualized)  Outcome: Ongoing, Progressing  Goal: Absence of Hospital-Acquired Illness or Injury  Outcome: Ongoing, Progressing  Goal: Optimal Comfort and Wellbeing  Outcome: Ongoing, Progressing  Goal: Readiness for Transition of Care  Outcome: Ongoing, Progressing     Problem: Diabetes Comorbidity  Goal: Blood Glucose Level Within Targeted Range  Outcome: Ongoing, Progressing     Problem: Anxiety  Goal: Anxiety Reduction or Resolution  Outcome: Ongoing, Progressing

## 2023-09-19 NOTE — PROGRESS NOTES
"   09/19/23 1350   Missed Time Reason   OT Attempted Eval Date 09/19/23   OT Attempted Eval Time 1350   Missed Treatment Reason Patient unwilling to participate     Pt politely declined participation in OT/PT evaluation at this time.   in room confirming pt report she is "not doing well" and not back to normal regarding mobility, but is able to ambulate in room for ADLs.  Pt c/o dizziness at times with standing, but stated does not therapy services at this time.  Pt stated okay for OT/PT to reattempt in AM tmrw d/t c/o dizziness with standing.    OT to f/u for eval tmrw as schedule allows and per pt's wishes.  "

## 2023-09-19 NOTE — NURSING
9/18/23 Patient had severe anxiety she was trembling and needed guidance and assistance in the shower. Her anxiety lasted throughout the night causing her HR to sustain in the 150-160 areas.Seroquel and Vistaril did not work for patient anxiety. Dr. Holley was notified. Patient has  at bedside.

## 2023-09-20 ENCOUNTER — ANESTHESIA EVENT (OUTPATIENT)
Dept: SURGERY | Facility: HOSPITAL | Age: 33
DRG: 988 | End: 2023-09-20
Payer: COMMERCIAL

## 2023-09-20 ENCOUNTER — ANESTHESIA (OUTPATIENT)
Dept: SURGERY | Facility: HOSPITAL | Age: 33
DRG: 988 | End: 2023-09-20
Payer: COMMERCIAL

## 2023-09-20 PROBLEM — E10.65 CONTROLLED TYPE 1 DIABETES MELLITUS WITH HYPERGLYCEMIA: Status: ACTIVE | Noted: 2023-09-20

## 2023-09-20 LAB
ALBUMIN SERPL-MCNC: 3.8 G/DL (ref 3.5–5)
ALBUMIN/GLOB SERPL: 1.2 RATIO (ref 1.1–2)
ALP SERPL-CCNC: 50 UNIT/L (ref 40–150)
ALT SERPL-CCNC: 6 UNIT/L (ref 0–55)
AST SERPL-CCNC: 10 UNIT/L (ref 5–34)
BASOPHILS # BLD AUTO: 0.03 X10(3)/MCL
BASOPHILS NFR BLD AUTO: 0.4 %
BILIRUB SERPL-MCNC: 0.5 MG/DL
BUN SERPL-MCNC: 6.9 MG/DL (ref 7–18.7)
CALCIUM SERPL-MCNC: 9.2 MG/DL (ref 8.4–10.2)
CHLORIDE SERPL-SCNC: 106 MMOL/L (ref 98–107)
CO2 SERPL-SCNC: 23 MMOL/L (ref 22–29)
COLLECT DURATION TIME UR: 24 H
CREAT SERPL-MCNC: 0.72 MG/DL (ref 0.55–1.02)
EOSINOPHIL # BLD AUTO: 0.1 X10(3)/MCL (ref 0–0.9)
EOSINOPHIL NFR BLD AUTO: 1.5 %
ERYTHROCYTE [DISTWIDTH] IN BLOOD BY AUTOMATED COUNT: 11.8 % (ref 11.5–17)
GFR SERPLBLD CREATININE-BSD FMLA CKD-EPI: >60 MLS/MIN/1.73/M2
GLOBULIN SER-MCNC: 3.1 GM/DL (ref 2.4–3.5)
GLUCOSE SERPL-MCNC: 201 MG/DL (ref 74–100)
HCT VFR BLD AUTO: 36.8 % (ref 37–47)
HGB BLD-MCNC: 12.5 G/DL (ref 12–16)
IMM GRANULOCYTES # BLD AUTO: 0.03 X10(3)/MCL (ref 0–0.04)
IMM GRANULOCYTES NFR BLD AUTO: 0.4 %
LYMPHOCYTES # BLD AUTO: 1.89 X10(3)/MCL (ref 0.6–4.6)
LYMPHOCYTES NFR BLD AUTO: 28 %
MCH RBC QN AUTO: 29.9 PG (ref 27–31)
MCHC RBC AUTO-ENTMCNC: 34 G/DL (ref 33–36)
MCV RBC AUTO: 88 FL (ref 80–94)
METANEPH 24H UR-MRATE: 161 MCG/24 H
METANEPHS 24H UR-MRATE: 566 MCG/24 H
MONOCYTES # BLD AUTO: 0.46 X10(3)/MCL (ref 0.1–1.3)
MONOCYTES NFR BLD AUTO: 6.8 %
NEUTROPHILS # BLD AUTO: 4.25 X10(3)/MCL (ref 2.1–9.2)
NEUTROPHILS NFR BLD AUTO: 62.9 %
NORMETANEPHRINE 24H UR-MRATE: 405 MCG/24 H
NRBC BLD AUTO-RTO: 0 %
PLATELET # BLD AUTO: 241 X10(3)/MCL (ref 130–400)
PMV BLD AUTO: 10.2 FL (ref 7.4–10.4)
POCT GLUCOSE: 142 MG/DL (ref 70–110)
POCT GLUCOSE: 149 MG/DL (ref 70–110)
POCT GLUCOSE: 172 MG/DL (ref 70–110)
POCT GLUCOSE: 204 MG/DL (ref 70–110)
POTASSIUM SERPL-SCNC: 4.1 MMOL/L (ref 3.5–5.1)
PROT SERPL-MCNC: 6.9 GM/DL (ref 6.4–8.3)
RBC # BLD AUTO: 4.18 X10(6)/MCL (ref 4.2–5.4)
SODIUM SERPL-SCNC: 138 MMOL/L (ref 136–145)
SPECIMEN VOL 24H UR: 3750 ML
WBC # SPEC AUTO: 6.76 X10(3)/MCL (ref 4.5–11.5)

## 2023-09-20 PROCEDURE — 36000708 HC OR TIME LEV III 1ST 15 MIN: Performed by: STUDENT IN AN ORGANIZED HEALTH CARE EDUCATION/TRAINING PROGRAM

## 2023-09-20 PROCEDURE — 63600175 PHARM REV CODE 636 W HCPCS: Performed by: STUDENT IN AN ORGANIZED HEALTH CARE EDUCATION/TRAINING PROGRAM

## 2023-09-20 PROCEDURE — D9220A PRA ANESTHESIA: ICD-10-PCS | Mod: ANES,,, | Performed by: ANESTHESIOLOGY

## 2023-09-20 PROCEDURE — 25000003 PHARM REV CODE 250: Performed by: NURSE ANESTHETIST, CERTIFIED REGISTERED

## 2023-09-20 PROCEDURE — 63600175 PHARM REV CODE 636 W HCPCS: Performed by: INTERNAL MEDICINE

## 2023-09-20 PROCEDURE — 63600175 PHARM REV CODE 636 W HCPCS

## 2023-09-20 PROCEDURE — 25000003 PHARM REV CODE 250: Performed by: STUDENT IN AN ORGANIZED HEALTH CARE EDUCATION/TRAINING PROGRAM

## 2023-09-20 PROCEDURE — 25000003 PHARM REV CODE 250

## 2023-09-20 PROCEDURE — D9220A PRA ANESTHESIA: Mod: CRNA,,, | Performed by: NURSE ANESTHETIST, CERTIFIED REGISTERED

## 2023-09-20 PROCEDURE — 21400001 HC TELEMETRY ROOM

## 2023-09-20 PROCEDURE — 63600175 PHARM REV CODE 636 W HCPCS: Performed by: ANESTHESIOLOGY

## 2023-09-20 PROCEDURE — 36000709 HC OR TIME LEV III EA ADD 15 MIN: Performed by: STUDENT IN AN ORGANIZED HEALTH CARE EDUCATION/TRAINING PROGRAM

## 2023-09-20 PROCEDURE — 71000033 HC RECOVERY, INTIAL HOUR: Performed by: STUDENT IN AN ORGANIZED HEALTH CARE EDUCATION/TRAINING PROGRAM

## 2023-09-20 PROCEDURE — D9220A PRA ANESTHESIA: ICD-10-PCS | Mod: CRNA,,, | Performed by: NURSE ANESTHETIST, CERTIFIED REGISTERED

## 2023-09-20 PROCEDURE — D9220A PRA ANESTHESIA: Mod: ANES,,, | Performed by: ANESTHESIOLOGY

## 2023-09-20 PROCEDURE — 97165 OT EVAL LOW COMPLEX 30 MIN: CPT

## 2023-09-20 PROCEDURE — 82962 GLUCOSE BLOOD TEST: CPT | Performed by: STUDENT IN AN ORGANIZED HEALTH CARE EDUCATION/TRAINING PROGRAM

## 2023-09-20 PROCEDURE — 27201423 OPTIME MED/SURG SUP & DEVICES STERILE SUPPLY: Performed by: STUDENT IN AN ORGANIZED HEALTH CARE EDUCATION/TRAINING PROGRAM

## 2023-09-20 PROCEDURE — 97161 PT EVAL LOW COMPLEX 20 MIN: CPT

## 2023-09-20 PROCEDURE — 37000008 HC ANESTHESIA 1ST 15 MINUTES: Performed by: STUDENT IN AN ORGANIZED HEALTH CARE EDUCATION/TRAINING PROGRAM

## 2023-09-20 PROCEDURE — 80053 COMPREHEN METABOLIC PANEL: CPT | Performed by: STUDENT IN AN ORGANIZED HEALTH CARE EDUCATION/TRAINING PROGRAM

## 2023-09-20 PROCEDURE — 88304 TISSUE EXAM BY PATHOLOGIST: CPT | Mod: TC | Performed by: STUDENT IN AN ORGANIZED HEALTH CARE EDUCATION/TRAINING PROGRAM

## 2023-09-20 PROCEDURE — 63600175 PHARM REV CODE 636 W HCPCS: Performed by: NURSE ANESTHETIST, CERTIFIED REGISTERED

## 2023-09-20 PROCEDURE — 37000009 HC ANESTHESIA EA ADD 15 MINS: Performed by: STUDENT IN AN ORGANIZED HEALTH CARE EDUCATION/TRAINING PROGRAM

## 2023-09-20 PROCEDURE — 85025 COMPLETE CBC W/AUTO DIFF WBC: CPT | Performed by: STUDENT IN AN ORGANIZED HEALTH CARE EDUCATION/TRAINING PROGRAM

## 2023-09-20 RX ORDER — BUPIVACAINE HYDROCHLORIDE 2.5 MG/ML
INJECTION, SOLUTION EPIDURAL; INFILTRATION; INTRACAUDAL
Status: DISCONTINUED | OUTPATIENT
Start: 2023-09-20 | End: 2023-09-20 | Stop reason: HOSPADM

## 2023-09-20 RX ORDER — METOCLOPRAMIDE HYDROCHLORIDE 5 MG/ML
10 INJECTION INTRAMUSCULAR; INTRAVENOUS ONCE AS NEEDED
Status: DISCONTINUED | OUTPATIENT
Start: 2023-09-20 | End: 2023-09-20

## 2023-09-20 RX ORDER — OXYCODONE HYDROCHLORIDE 5 MG/1
5 TABLET ORAL EVERY 6 HOURS PRN
Status: DISCONTINUED | OUTPATIENT
Start: 2023-09-20 | End: 2023-09-22 | Stop reason: HOSPADM

## 2023-09-20 RX ORDER — DIPHENHYDRAMINE HYDROCHLORIDE 50 MG/ML
6.25 INJECTION INTRAMUSCULAR; INTRAVENOUS ONCE AS NEEDED
Status: COMPLETED | OUTPATIENT
Start: 2023-09-20 | End: 2023-09-20

## 2023-09-20 RX ORDER — LIDOCAINE HYDROCHLORIDE 20 MG/ML
INJECTION INTRAVENOUS
Status: DISCONTINUED | OUTPATIENT
Start: 2023-09-20 | End: 2023-09-20

## 2023-09-20 RX ORDER — KETOROLAC TROMETHAMINE 30 MG/ML
INJECTION, SOLUTION INTRAMUSCULAR; INTRAVENOUS
Status: DISCONTINUED | OUTPATIENT
Start: 2023-09-20 | End: 2023-09-20

## 2023-09-20 RX ORDER — ACETAMINOPHEN 500 MG
1000 TABLET ORAL EVERY 6 HOURS
Status: DISCONTINUED | OUTPATIENT
Start: 2023-09-20 | End: 2023-09-22 | Stop reason: HOSPADM

## 2023-09-20 RX ORDER — ONDANSETRON 2 MG/ML
INJECTION INTRAMUSCULAR; INTRAVENOUS
Status: DISCONTINUED | OUTPATIENT
Start: 2023-09-20 | End: 2023-09-20

## 2023-09-20 RX ORDER — PROPOFOL 10 MG/ML
VIAL (ML) INTRAVENOUS
Status: DISCONTINUED | OUTPATIENT
Start: 2023-09-20 | End: 2023-09-20

## 2023-09-20 RX ORDER — MIDAZOLAM HYDROCHLORIDE 1 MG/ML
1 INJECTION INTRAMUSCULAR; INTRAVENOUS ONCE AS NEEDED
Status: COMPLETED | OUTPATIENT
Start: 2023-09-20 | End: 2023-09-20

## 2023-09-20 RX ORDER — FENTANYL CITRATE 50 UG/ML
INJECTION, SOLUTION INTRAMUSCULAR; INTRAVENOUS
Status: DISCONTINUED | OUTPATIENT
Start: 2023-09-20 | End: 2023-09-20

## 2023-09-20 RX ORDER — ATROPINE SULFATE 0.4 MG/ML
INJECTION, SOLUTION ENDOTRACHEAL; INTRAMEDULLARY; INTRAMUSCULAR; INTRAVENOUS; SUBCUTANEOUS
Status: DISCONTINUED | OUTPATIENT
Start: 2023-09-20 | End: 2023-09-20

## 2023-09-20 RX ORDER — AMLODIPINE BESYLATE 5 MG/1
5 TABLET ORAL DAILY
Status: DISCONTINUED | OUTPATIENT
Start: 2023-09-20 | End: 2023-09-22 | Stop reason: HOSPADM

## 2023-09-20 RX ORDER — OXYCODONE HYDROCHLORIDE 5 MG/1
5 TABLET ORAL
Status: DISCONTINUED | OUTPATIENT
Start: 2023-09-20 | End: 2023-09-20

## 2023-09-20 RX ORDER — HEPARIN SODIUM 5000 [USP'U]/ML
5000 INJECTION, SOLUTION INTRAVENOUS; SUBCUTANEOUS
Status: COMPLETED | OUTPATIENT
Start: 2023-09-20 | End: 2023-09-20

## 2023-09-20 RX ORDER — OLANZAPINE 10 MG/2ML
2.5 INJECTION, POWDER, FOR SOLUTION INTRAMUSCULAR ONCE AS NEEDED
Status: DISCONTINUED | OUTPATIENT
Start: 2023-09-20 | End: 2023-09-22 | Stop reason: HOSPADM

## 2023-09-20 RX ORDER — METHOCARBAMOL 500 MG/1
500 TABLET, FILM COATED ORAL 4 TIMES DAILY
Status: DISCONTINUED | OUTPATIENT
Start: 2023-09-20 | End: 2023-09-22 | Stop reason: HOSPADM

## 2023-09-20 RX ORDER — HYDROMORPHONE HYDROCHLORIDE 1 MG/ML
INJECTION, SOLUTION INTRAMUSCULAR; INTRAVENOUS; SUBCUTANEOUS
Status: DISPENSED
Start: 2023-09-20 | End: 2023-09-21

## 2023-09-20 RX ORDER — LIDOCAINE HYDROCHLORIDE 10 MG/ML
1 INJECTION, SOLUTION EPIDURAL; INFILTRATION; INTRACAUDAL; PERINEURAL ONCE
Status: DISCONTINUED | OUTPATIENT
Start: 2023-09-20 | End: 2023-09-22 | Stop reason: HOSPADM

## 2023-09-20 RX ORDER — PHENYLEPHRINE HYDROCHLORIDE 10 MG/ML
INJECTION INTRAVENOUS
Status: DISCONTINUED | OUTPATIENT
Start: 2023-09-20 | End: 2023-09-20

## 2023-09-20 RX ORDER — HYDROMORPHONE HYDROCHLORIDE 1 MG/ML
0.2 INJECTION, SOLUTION INTRAMUSCULAR; INTRAVENOUS; SUBCUTANEOUS EVERY 5 MIN PRN
Status: DISCONTINUED | OUTPATIENT
Start: 2023-09-20 | End: 2023-09-20

## 2023-09-20 RX ORDER — ROCURONIUM BROMIDE 10 MG/ML
INJECTION, SOLUTION INTRAVENOUS
Status: DISCONTINUED | OUTPATIENT
Start: 2023-09-20 | End: 2023-09-20

## 2023-09-20 RX ORDER — GABAPENTIN 300 MG/1
300 CAPSULE ORAL 3 TIMES DAILY
Status: DISCONTINUED | OUTPATIENT
Start: 2023-09-20 | End: 2023-09-22 | Stop reason: HOSPADM

## 2023-09-20 RX ORDER — DROPERIDOL 2.5 MG/ML
0.25 INJECTION, SOLUTION INTRAMUSCULAR; INTRAVENOUS ONCE AS NEEDED
Status: DISCONTINUED | OUTPATIENT
Start: 2023-09-20 | End: 2023-09-20

## 2023-09-20 RX ORDER — DEXMEDETOMIDINE HYDROCHLORIDE 100 UG/ML
INJECTION, SOLUTION INTRAVENOUS
Status: DISCONTINUED | OUTPATIENT
Start: 2023-09-20 | End: 2023-09-20

## 2023-09-20 RX ORDER — CEFAZOLIN SODIUM 1 G/3ML
2 INJECTION, POWDER, FOR SOLUTION INTRAMUSCULAR; INTRAVENOUS
Status: COMPLETED | OUTPATIENT
Start: 2023-09-20 | End: 2023-09-20

## 2023-09-20 RX ORDER — DIPHENHYDRAMINE HYDROCHLORIDE 50 MG/ML
INJECTION INTRAMUSCULAR; INTRAVENOUS
Status: DISPENSED
Start: 2023-09-20 | End: 2023-09-21

## 2023-09-20 RX ORDER — SODIUM CHLORIDE, SODIUM LACTATE, POTASSIUM CHLORIDE, CALCIUM CHLORIDE 600; 310; 30; 20 MG/100ML; MG/100ML; MG/100ML; MG/100ML
INJECTION, SOLUTION INTRAVENOUS CONTINUOUS
Status: DISCONTINUED | OUTPATIENT
Start: 2023-09-20 | End: 2023-09-22 | Stop reason: HOSPADM

## 2023-09-20 RX ORDER — INSULIN ASPART 100 [IU]/ML
4 INJECTION, SOLUTION INTRAVENOUS; SUBCUTANEOUS
Status: DISCONTINUED | OUTPATIENT
Start: 2023-09-20 | End: 2023-09-22 | Stop reason: HOSPADM

## 2023-09-20 RX ORDER — MORPHINE SULFATE 2 MG/ML
2 INJECTION, SOLUTION INTRAMUSCULAR; INTRAVENOUS EVERY 4 HOURS PRN
Status: DISCONTINUED | OUTPATIENT
Start: 2023-09-20 | End: 2023-09-22 | Stop reason: HOSPADM

## 2023-09-20 RX ADMIN — METHOCARBAMOL 500 MG: 500 TABLET ORAL at 06:09

## 2023-09-20 RX ADMIN — GABAPENTIN 300 MG: 300 CAPSULE ORAL at 08:09

## 2023-09-20 RX ADMIN — ROCURONIUM BROMIDE 40 MG: 10 INJECTION INTRAVENOUS at 02:09

## 2023-09-20 RX ADMIN — SUGAMMADEX 200 MG: 100 INJECTION, SOLUTION INTRAVENOUS at 04:09

## 2023-09-20 RX ADMIN — SODIUM CHLORIDE: 9 INJECTION, SOLUTION INTRAVENOUS at 12:09

## 2023-09-20 RX ADMIN — HYDROMORPHONE HYDROCHLORIDE 0.2 MG: 1 INJECTION, SOLUTION INTRAMUSCULAR; INTRAVENOUS; SUBCUTANEOUS at 04:09

## 2023-09-20 RX ADMIN — ACETAMINOPHEN 1000 MG: 500 TABLET, FILM COATED ORAL at 06:09

## 2023-09-20 RX ADMIN — QUETIAPINE FUMARATE 50 MG: 25 TABLET ORAL at 08:09

## 2023-09-20 RX ADMIN — PROPOFOL 125 MG: 10 INJECTION, EMULSION INTRAVENOUS at 02:09

## 2023-09-20 RX ADMIN — ACETAMINOPHEN 1000 MG: 500 TABLET, FILM COATED ORAL at 11:09

## 2023-09-20 RX ADMIN — INSULIN ASPART 4 UNITS: 100 INJECTION, SOLUTION INTRAVENOUS; SUBCUTANEOUS at 08:09

## 2023-09-20 RX ADMIN — ATROPINE SULFATE 0.2 MG: 0.4 INJECTION, SOLUTION INTRAVENOUS at 03:09

## 2023-09-20 RX ADMIN — KETOROLAC TROMETHAMINE 30 MG: 30 INJECTION, SOLUTION INTRAMUSCULAR at 04:09

## 2023-09-20 RX ADMIN — LIDOCAINE HYDROCHLORIDE 50 MG: 20 INJECTION INTRAVENOUS at 02:09

## 2023-09-20 RX ADMIN — DEXMEDETOMIDINE 10 MCG: 200 INJECTION, SOLUTION INTRAVENOUS at 04:09

## 2023-09-20 RX ADMIN — INSULIN DETEMIR 5 UNITS: 100 INJECTION, SOLUTION SUBCUTANEOUS at 09:09

## 2023-09-20 RX ADMIN — METOPROLOL SUCCINATE 12.5 MG: 25 TABLET, EXTENDED RELEASE ORAL at 08:09

## 2023-09-20 RX ADMIN — SODIUM CHLORIDE: 9 INJECTION, SOLUTION INTRAVENOUS at 01:09

## 2023-09-20 RX ADMIN — FENTANYL CITRATE 50 MCG: 50 INJECTION INTRAMUSCULAR; INTRAVENOUS at 03:09

## 2023-09-20 RX ADMIN — PHENYLEPHRINE HYDROCHLORIDE 100 MCG: 10 INJECTION INTRAVENOUS at 03:09

## 2023-09-20 RX ADMIN — INSULIN DETEMIR 10 UNITS: 100 INJECTION, SOLUTION SUBCUTANEOUS at 08:09

## 2023-09-20 RX ADMIN — HEPARIN SODIUM 5000 UNITS: 5000 INJECTION, SOLUTION INTRAVENOUS; SUBCUTANEOUS at 01:09

## 2023-09-20 RX ADMIN — ROCURONIUM BROMIDE 10 MG: 10 INJECTION INTRAVENOUS at 03:09

## 2023-09-20 RX ADMIN — SODIUM CHLORIDE, POTASSIUM CHLORIDE, SODIUM LACTATE AND CALCIUM CHLORIDE: 600; 310; 30; 20 INJECTION, SOLUTION INTRAVENOUS at 03:09

## 2023-09-20 RX ADMIN — MIDAZOLAM HYDROCHLORIDE 1 MG: 1 INJECTION, SOLUTION INTRAMUSCULAR; INTRAVENOUS at 02:09

## 2023-09-20 RX ADMIN — PHENYLEPHRINE HYDROCHLORIDE 200 MCG: 10 INJECTION INTRAVENOUS at 03:09

## 2023-09-20 RX ADMIN — FENTANYL CITRATE 50 MCG: 50 INJECTION INTRAMUSCULAR; INTRAVENOUS at 02:09

## 2023-09-20 RX ADMIN — ONDANSETRON 4 MG: 2 INJECTION INTRAMUSCULAR; INTRAVENOUS at 04:09

## 2023-09-20 RX ADMIN — CEFAZOLIN 2 G: 330 INJECTION, POWDER, FOR SOLUTION INTRAMUSCULAR; INTRAVENOUS at 02:09

## 2023-09-20 RX ADMIN — METHOCARBAMOL 500 MG: 500 TABLET ORAL at 08:09

## 2023-09-20 RX ADMIN — AMLODIPINE BESYLATE 5 MG: 5 TABLET ORAL at 12:09

## 2023-09-20 RX ADMIN — SODIUM CHLORIDE, POTASSIUM CHLORIDE, SODIUM LACTATE AND CALCIUM CHLORIDE: 600; 310; 30; 20 INJECTION, SOLUTION INTRAVENOUS at 02:09

## 2023-09-20 RX ADMIN — DIPHENHYDRAMINE HYDROCHLORIDE 6.25 MG: 50 INJECTION INTRAMUSCULAR; INTRAVENOUS at 04:09

## 2023-09-20 RX ADMIN — PHENYLEPHRINE HYDROCHLORIDE 500 MCG: 10 INJECTION INTRAVENOUS at 03:09

## 2023-09-20 NOTE — ANESTHESIA POSTPROCEDURE EVALUATION
Anesthesia Post Evaluation    Patient: Bette Dangelo    Procedure(s) Performed: Procedure(s) (LRB):  CHOLECYSTECTOMY, LAPAROSCOPIC (N/A)    Final Anesthesia Type: general      Patient location during evaluation: PACU  Patient participation: Yes- Able to Participate  Level of consciousness: awake and alert  Post-procedure vital signs: reviewed and stable  Pain management: adequate  Airway patency: patent    PONV status at discharge: No PONV  Anesthetic complications: no      Cardiovascular status: hemodynamically stable  Respiratory status: unassisted  Hydration status: euvolemic  Follow-up not needed.          Vitals Value Taken Time   /99 09/20/23 1649   Temp 36.9 °C (98.4 °F) 09/20/23 1625   Pulse 90 09/20/23 1649   Resp 18 09/20/23 1653   SpO2 100 % 09/20/23 1649         No case tracking events are documented in the log.      Pain/Alek Score: Pain Rating Prior to Med Admin: 6 (9/20/2023  4:53 PM)  Alek Score: 9 (9/20/2023  4:25 PM)

## 2023-09-20 NOTE — ANESTHESIA PREPROCEDURE EVALUATION
09/20/2023  Bette Dangelo is a 33 y.o., female with DM1 admitted in DKA now resolved, bipolar d/o, ongoing abdominal pain and biliary colic for lap nura.      Pre-op Assessment    I have reviewed the Patient Summary Reports.     I have reviewed the Nursing Notes. I have reviewed the NPO Status.   I have reviewed the Medications.     Review of Systems  Anesthesia Hx:  No problems with previous Anesthesia  Denies Family Hx of Anesthesia complications.   Denies Personal Hx of Anesthesia complications.   Hematology/Oncology:  Hematology Normal   Oncology Normal     EENT/Dental:  EENT/Dental Normal  Ears General/Symptom(s) Ear Disease: Tympanic Membrane Problem   Cardiovascular:  Cardiovascular Normal     Pulmonary:  Pulmonary Normal    Renal/:  Renal/ Normal     Hepatic/GI:  Hepatic/GI Normal    Musculoskeletal:  Musculoskeletal Normal    Neurological:  Neurology Normal    Endocrine:  Endocrine Normal    Dermatological:  Skin Normal    Psych:  Psychiatric Normal           Physical Exam  General: Well nourished    Airway:  Mallampati: II   Mouth Opening: Normal  TM Distance: Normal  Tongue: Normal  Neck ROM: Normal ROM    Dental:  Intact    Chest/Lungs:  Clear to auscultation, Normal Respiratory Rate    Heart:  Rate: Normal  Rhythm: Regular Rhythm        Anesthesia Plan  Type of Anesthesia, risks & benefits discussed:    Anesthesia Type: Gen ETT  Intra-op Monitoring Plan: Standard ASA Monitors  Post Op Pain Control Plan: multimodal analgesia and IV/PO Opioids PRN  Induction:  IV and rapid sequence  Airway Plan: Direct  Informed Consent: Informed consent signed with the Patient and all parties understand the risks and agree with anesthesia plan.  All questions answered.   ASA Score: 3 Emergent  Day of Surgery Review of History & Physical: H&P Update referred to the surgeon/provider.I have interviewed and  examined the patient. I have reviewed the patient's H&P dated: There are no significant changes. H&P completed by Anesthesiologist.    Ready For Surgery From Anesthesia Perspective.     .

## 2023-09-20 NOTE — OP NOTE
[facility] - [address] [place] [address] Telemetry  Operative Note    Surgery Date: 9/20/2023     Surgeon(s) and Role:     * Deepak Peña MD - Primary     * David Morris MD - Resident - Assisting        Pre-op Diagnosis:  symptomatic cholelithiasis    Post-op Diagnosis:  same as above    Procedure(s) (LRB):  CHOLECYSTECTOMY, LAPAROSCOPIC (N/A)    Anesthesia: General    Operative Findings:   Gallstones    Technique:  Patient was evaluated and examined preoperatively by myself. Risks, benefits, and alternatives discussed with the patient. Questions answered and patient was consented for surgery.     The patient was brought to the operating room, placed supine on the operating table, and general anesthesia was induced. Once adequately anesthetized, the abdomen was prepped and draped in the usual sterile fashion. A time out was performed, confirming the correct patient, procedure, and location and all team members were in agreement.     A varus needle was introduced into the LUQ and pneumoperitoneum was then created with CO2 to 15 mmHg and tolerated well without any adverse changes in the patient's vital signs. A supraumbilical incision was made and a 5 mm optical view trocar was placed. Camera was introduced into the abdomen and surveyed in all four quadrants. There was no sign of injury to intraabdominal structures or viscus from initial trocar placement. Additional trocars were introduced under direct vision. A 12 mm in epigastric and 2 additional 5mm ports in the right upper quadrant. The patient was positioned in the supine position with some reverse Trendelenburg and right side up. The gallbladder was identified, the fundus grasped and retracted cephalad and laterally. The infundibulum was grasped and retracted laterally, exposing the peritoneum overlying the triangle of Calot. This was then divided and exposed in a blunt fashion. The cystic duct was clearly identified and bluntly dissected  circumferentially. The junctions of the gallbladder, cystic duct and common bile duct were clearly identified prior to the division of any linear structure. The cystic duct was then doubly ligated with surgical clips on the patient side and singly clipped on the gallbladder side and divided. The cystic artery was identified, dissected free, ligated doubly with clips on the patient side and with a single clip on the gallbladder side and divided as well. The gallbladder was dissected from the liver bed in retrograde fashion with the monopolar hook electrocautery. The gallbladder was removed. The liver bed was irrigated and inspected. Hemostasis was achieved with the electrocautery. Copious irrigation was utilized and was repeatedly aspirated until clear all particulate matter. Pneumoperitoneum was completely reduced after viewing removal of the trocars under direct vision. The wound was thoroughly irrigated and the fascia was then closed with a single interrupted using PMI; the skin was then closed with 4-0 and dermabond was applied. Instrument, sponge, and needle counts were correct at closure and at the conclusion of the case.      Patient tolerated the procedure well without complications. Patient was extubated and taken to the postoperative care area.     Dr. Peña was present for critical portions and available throughout the case.     Estimated Blood Loss: 7cc         Specimens:   Specimen (24h ago, onward)       Start     Ordered    09/20/23 1607  Specimen to Pathology  RELEASE UPON ORDERING        References:    Click here for ordering Quick Tip   Question:  Release to patient  Answer:  Immediate    09/20/23 1607                    [id number]    David Morris MD  09/20/2023 4:18 PM

## 2023-09-20 NOTE — TRANSFER OF CARE
Anesthesia Transfer of Care Note    Patient: Bette Dangelo    Procedure(s) Performed: Procedure(s) (LRB):  CHOLECYSTECTOMY, LAPAROSCOPIC (N/A)    Patient location: PACU    Anesthesia Type: general    Transport from OR: Transported from OR on room air with adequate spontaneous ventilation    Post pain: adequate analgesia    Post assessment: no apparent anesthetic complications    Post vital signs: stable    Level of consciousness: awake    Nausea/Vomiting: no nausea/vomiting    Complications: none    Transfer of care protocol was followed

## 2023-09-20 NOTE — ANESTHESIA PROCEDURE NOTES
Intubation    Date/Time: 9/20/2023 2:45 PM    Performed by: Nhan Joseph CRNA  Authorized by: Yasmine Bautista MD    Intubation:     Induction:  Intravenous    Intubated:  Postinduction    Mask Ventilation:  Easy with oral airway    Attempts:  1    Attempted By:  CRNA    Method of Intubation:  Direct    Blade:  Mary 2    Laryngeal View Grade: Grade IIA - cords partially seen      Difficult Airway Encountered?: No      Complications:  None    Airway Device:  Oral endotracheal tube    Airway Device Size:  7.5    Style/Cuff Inflation:  Cuffed (inflated to minimal occlusive pressure)    Inflation Amount (mL):  6    Tube secured:  21    Secured at:  The lips    Placement Verified By:  Capnometry    Complicating Factors:  None    Findings Post-Intubation:  BS equal bilateral and atraumatic/condition of teeth unchanged

## 2023-09-20 NOTE — ADDENDUM NOTE
Addendum  created 09/20/23 1716 by Key Felix CRNA    Intraprocedure Meds edited, Orders acknowledged in Narrator

## 2023-09-20 NOTE — PROGRESS NOTES
[facility] Endocrinology Consult Note     Reason for Consult:      Insulin pump management    Subjective:     History of Present Illness:  Bette Dangelo is a 33 y.o. female who  has a past medical history of Diabetes mellitus type I, TMJ (dislocation of temporomandibular joint), and Vitamin D deficiency.. Endocrinology is being consulted for insulin pump management.    Patient presented to [facility] on 9/16/23 after being brought from [facility] for hyperglycemia. 6 days prior to presentation, patient was reportedly having issues with mood and anxiety and presented to [facility] for voluntary admision. She obtained medical clearance for admission from [facility] on 9/15/23; however, while at [facility], patient was not using insulin pump. It is unclear if facility did not want to use pump or if it was removed by patient. Patient does report issues with pump compliance for several years whenever her psychiatric illness is uncontrolled, and has been hospitalized for DKA in the past multiple times. Reports that she has not seen a psychiatrist in a while and has not been on any medications due to changes in insurance. Due to elevated CBGs while at [facility], she was brought to [facility] ED for further evaluation on 9/16/23 and was found to be in DKA. DKA protocol/insulin drip initiated, and once gap was closed and resolved, she was transitioned to subcutaneous insulin. Endocrinology is being consulted for insulin pump management, as patient would like to continue using her pump rather than injections    9/20/23: No major changes in patient's condition overnight. No acute complaints at this time. Patient and  report plan is for transfer to [facility] for inpatient psychiatric treatment. They are willing to continue with subcutaneous insulin while at [facility]. CBGs over past 24 hours above goal, required 3 units correctional insulin. No issues with hypoglycemia. Has had good PO intake.     Review of Systems:  ROS negative except for  HPI    Past Medical History: See above    Past Surgical History:  Past Surgical History:   Procedure Laterality Date    BACK SURGERY      1999     Allergies:  Review of patient's allergies indicates:  No Known Allergies    Home Medications:  Prior to Admission medications    Medication Sig Start Date End Date Taking? Authorizing Provider   cholecalciferol, vitamin D3, 1,250 mcg (50,000 unit) capsule Take 50,000 Units by mouth every 7 days. 7/5/22   Provider, Historical   cholecalciferol, vitamin D3, 1,250 mcg (50,000 unit) capsule Take 50,000 Units by mouth. 5/2/22   Provider, Historical   clindamycin (CLEOCIN) 300 MG capsule Take by mouth. 7/5/22   Provider, Historical   clomiPHENE (CLOMID) 50 mg tablet Take by mouth. 6/20/22   Provider, Historical   famotidine (PEPCID) 20 MG tablet Take 1 tablet (20 mg total) by mouth 2 (two) times daily. 9/15/23 9/14/24  Oswald Blunt MD   fish oil/borage/flax/om3,6,9 1 (OMEGA 3-6-9 ORAL) Take by mouth.    Provider, Historical   FLOVENT  mcg/actuation inhaler Inhale 2 puffs into the lungs 2 (two) times daily. 8/30/22   Provider, Historical   fluticasone propionate (FLONASE) 50 mcg/actuation nasal spray 1 spray by Each Nostril route once daily. 1/19/22   Provider, Historical   glucagon (BAQSIMI) 3 mg/actuation Spry 3 mg by Nasal route daily as needed. 12/22/21   Provider, Historical   hydrOXYzine pamoate (VISTARIL) 25 MG Cap once as needed. 12/14/21   Provider, Historical   insulin aspart U-100 (NOVOLOG) 100 unit/mL (3 mL) InPn pen Novolog Flexpen U-100 Insulin Take No date recorded No form recorded No frequency recorded No route recorded No set duration recorded No set duration amount recorded active No dosage strength recorded No dosage strength units of measure recorded    Provider, Historical   insulin aspart U-100 (NOVOLOG) 100 unit/mL injection 300 units every 3 days per insulin pump 12/22/21   Provider, Historical   insulin glargine 100 units/mL SubQ pen  "Inject 16 Units into the skin. 12/22/21   Provider, Historical   meloxicam (MOBIC) 7.5 MG tablet Take 7.5 mg by mouth nightly. 6/22/22   Provider, Historical   meloxicam (MOBIC) 7.5 MG tablet Take 1 tablet by mouth every evening. 6/22/22   Provider, Historical   ondansetron (ZOFRAN) 8 MG tablet Take 8 mg by mouth every 8 (eight) hours as needed. 11/4/22   Provider, Historical   ondansetron (ZOFRAN-ODT) 4 MG TbDL Take 1 tablet (4 mg total) by mouth every 6 (six) hours as needed (Nausea). 9/15/23   Oswald Blunt MD     Family History:  Family History   Problem Relation Age of Onset    Mitral valve prolapse Mother     Other Father         mental concerns    Other Sister         heart issues    No Known Problems Brother     Breast cancer Maternal Grandmother      Social History:  Social History     Tobacco Use    Smoking status: Never    Smokeless tobacco: Never   Substance Use Topics    Alcohol use: Yes     Comment: Occasionally    Drug use: Never          Objective:   Vitals  Vitals  BP: (!) 148/98  Temp: 98.5 °F (36.9 °C)  Temp Source: Oral  Pulse: 89  Resp: 15  SpO2: 97 %  Height: 5' 7" (170.2 cm)  Weight: 66.4 kg (146 lb 6.2 oz)    Physical Examination:  Physical Exam  Constitutional:       General: She is not in acute distress.  HENT:      Head: Normocephalic and atraumatic.      Right Ear: External ear normal.      Left Ear: External ear normal.      Nose: Nose normal.      Mouth/Throat:      Pharynx: Oropharynx is clear.   Eyes:      Extraocular Movements: Extraocular movements intact.      Conjunctiva/sclera: Conjunctivae normal.      Pupils: Pupils are equal, round, and reactive to light.   Cardiovascular:      Rate and Rhythm: Normal rate and regular rhythm.      Pulses: Normal pulses.      Heart sounds: Normal heart sounds.   Pulmonary:      Effort: Pulmonary effort is normal. No respiratory distress.      Breath sounds: Normal breath sounds.   Abdominal:      General: Abdomen is flat. Bowel sounds " are normal.      Palpations: Abdomen is soft.   Musculoskeletal:         General: No swelling or deformity.      Cervical back: Normal range of motion and neck supple.   Skin:     General: Skin is warm and dry.   Neurological:      General: No focal deficit present.      Mental Status: She is alert. Mental status is at baseline.       Radiology:  No results found in the last 24 hours.     Assessment & Plan:   Type 1 DM  Bipolar disorder  Cholelithiasis    -Patient is currently pending inpatient psychiatric placement. Continue management of DM with subcutaneous insulin at this time. Once bipolar disorder is better controlled and patient is discharged from psychiatric facility, patient can consider restarting pump/scheduling appointment with her endocrinologist to discuss timing of transitioning back to pump  -Continue detemir 5 units AM, 10 units PM. Mealtime insulin increased to 4 units  aspart TIDAC. Continue to monitor CBGs, adjust as appropriate  -Continue  correctional insulin as follows: 1 unit for -250, 2 units for -300, 3 units for 301-350, 4 units for >350  -Thank you for this consult. We will sign off at this time.     Hope Ho MD  [facility] Internal Medicine, PGY-3

## 2023-09-20 NOTE — PT/OT/SLP EVAL
Physical Therapy Evaluation & Discharge Note    Patient Name:  Bette Dangelo   MRN:  [id number]    Recommendations     Discharge Recommendations:    psychiatric hospital   Discharge Equipment Recommendations: none   Barriers to discharge:  psychiatric issues    Assessment     Bette Dangelo is a 33 y.o. female admitted with a medical diagnosis of <principal problem not specified>.    She presents with the following impairments/functional limitations:  decreased coordination.  1. Diabetic ketoacidosis without coma associated with type 1 diabetes mellitus    2. Nausea and vomiting    3. Delirium    4. Dehydration    5. Tachyarrhythmia    6. Tachycardia    7. Symptomatic cholelithiasis    8. Controlled type 1 diabetes mellitus with hyperglycemia        Patient was seen by therapy for evaluation only.    Recent Surgery: Procedure(s) (LRB):  CHOLECYSTECTOMY, LAPAROSCOPIC (N/A) Day of Surgery    Plan     Patient discharged from acute PT Services on 09/20/23.    Based on current functional levels observed, patient does not require further acute PT services.  Re-consult PT Services if patient's status changes and therapy services warranted.    Subjective     Communicated with patient's nurse Deirdre prior to session.    Patient agreeable to participate in evaluation.     Chief Complaint: she had a stomach ache  Patient/Family Comments/goals: go to Psych hospital and get sx before over here.  Pain/Comfort:  Pain Rating 1: 2/10  Location - Orientation 1: lower  Location 1: abdomen  Pain Addressed 1: Nurse notified  Pain Rating Post-Intervention 1: 3/10    Patients cultural, spiritual, Buddhism conflicts given the current situation: no    Social History  Living Environment: Patient  lives with her     Functional Level: Prior to admission patient ambulated without assistive device and was independent in ADL's.  Equipment at Home: none  Assistance Upon Discharge: spouse.    Objective     Patient found  sitting in the bed   with telemetry, peripheral IV  upon PT entry to room.    General Precautions: Standard, fall   Orthopedic Precautions:    Braces:    Respiratory Status: room air      Exams:  Orientation: Patient is oriented to Person, Place, Time, Situation  Commands: Patient follows commands consistently  Gross Motor Coordination:  impaired foot tapping , tremors noticed.  RLE ROM: WNL  RLE Strength: WNL  LLE ROM: WNL  LLE Strength: WNL    Functional Mobility:    Bed Mobility:  Modified independent    Transfers:  Sit to Stand: modified independence with no assistive device    Gait:  Patient ambulated 260 ft with no assistive device and supervision.  Patient demonstrates no loss of balance.    Other Mobility:  not assessed    Balance:  Sit  Static: NORMAL: No deviations seen in posture held statically  Dynamic: NORMAL: No deviations seen in posture held dynamically  Stand  Static: NORMAL: No deviations seen in posture held statically  Dynamic: GOOD+: Independent gait (with or without assistive device)    Patient left sitting edge of bed with all lines intact, call button in reach, nurse notified, and  present.    Education     White board updated regarding patient's safest level of mobility with staff assistance.    Goals - No STG's or LTG's established secondary to patient was seen for evaluation and discharge     Multidisciplinary Problems       Physical Therapy Goals       Not on file                    History     Past Medical History:   Diagnosis Date    Diabetes mellitus type I     TMJ (dislocation of temporomandibular joint)     Vitamin D deficiency      Past Surgical History:   Procedure Laterality Date    BACK SURGERY      1999     Time Tracking     PT Received On: 09/20/23  PT Start Time: 0837     PT Stop Time: 0900  PT Total Time (min): 23 min     Billable Minutes: Evaluation 23 09/20/2023

## 2023-09-20 NOTE — CONSULTS
Acute Care Surgery  Consult Note  YOB: 1990  Date: 09/19/2023 11:59 PM  Date of Admission: 9/16/2023  HD#3  POD#* No surgery found *    PRESENTING HISTORY   Chief Complaint/Reason for Admission: DKA    History of Present Illness:  Bette Dangelo is a 33 y.o. female with PMHx of DM2, bipolar disorder, and biliary colic who was admitted for management of DKA. General Surgery consulted for history of biliary colic and previous US findings of gallstones. Prior to this admission, she was planning to have her gallbladder taken out 9/21 at OSH. First noticed RUQ and epigastric pain in 11/2022 and has episodes approximately once month with nausea, usually worse with her cycle. Reports that her most recent bout of DKA arose from overwhelming anxiety about her surgery. She denies any fevers, chills, chest pain, back pain, bloody or tarry stool, blood in stool.     Review of Systems:  12 point ROS negative except as stated in HPI    HISTORY:   Allergies:  Review of patient's allergies indicates:  No Known Allergies  Medications  Current Outpatient Medications   Medication Instructions    BAQSIMI 3 mg, Nasal, Daily PRN    cholecalciferol (vitamin D3) 50,000 Units, Oral, Every 7 days    cholecalciferol (vitamin D3) 50,000 Units, Oral    clindamycin (CLEOCIN) 300 MG capsule Oral    clomiPHENE (CLOMID) 50 mg tablet Oral    famotidine (PEPCID) 20 mg, Oral, 2 times daily    fish oil/borage/flax/om3,6,9 1 (OMEGA 3-6-9 ORAL) Oral    FLOVENT  mcg/actuation inhaler 2 puffs, Inhalation, 2 times daily    fluticasone propionate (FLONASE) 50 mcg/actuation nasal spray 1 spray, Each Nostril, Daily    hydrOXYzine pamoate (VISTARIL) 25 MG Cap Once as needed    insulin aspart U-100 (NOVOLOG) 100 unit/mL (3 mL) InPn pen Novolog Flexpen U-100 Insulin Take No date recorded No form recorded No frequency recorded No route recorded No set duration recorded No set duration amount recorded active No dosage strength recorded No  "dosage strength units of measure recorded    insulin aspart U-100 (NOVOLOG) 100 unit/mL injection 300 units every 3 days per insulin pump    insulin 16 Units, Subcutaneous    meloxicam (MOBIC) 7.5 MG tablet 1 tablet, Oral, Nightly    meloxicam (MOBIC) 7.5 mg, Oral, Nightly    ondansetron (ZOFRAN) 8 mg, Oral, Every 8 hours PRN    ondansetron (ZOFRAN-ODT) 4 mg, Oral, Every 6 hours PRN     Past Medical History:  Past Medical History:   Diagnosis Date    Diabetes mellitus type I     TMJ (dislocation of temporomandibular joint)     Vitamin D deficiency      Past Surgical History:  Past Surgical History:   Procedure Laterality Date    BACK SURGERY      1999     Family History:  Family History   Problem Relation Age of Onset    Mitral valve prolapse Mother     Other Father         mental concerns    Other Sister         heart issues    No Known Problems Brother     Breast cancer Maternal Grandmother      Social History:  Social History     Tobacco Use   Smoking Status Never   Smokeless Tobacco Never      Social History     Substance and Sexual Activity   Alcohol Use Yes    Comment: Occasionally        OBJECTIVE:   Vital Signs:  VITAL SIGNS: 24 HR MIN & MAX LAST   Temp  Min: 97.4 °F (36.3 °C)  Max: 98.4 °F (36.9 °C)  97.4 °F (36.3 °C)   BP  Min: 127/88  Max: 152/92  (!) 152/92    Pulse  Min: 83  Max: 121  83    Resp  Min: 15  Max: 15  15    SpO2  Min: 96 %  Max: 100 %  99 %      HT: 5' 7" (170.2 cm)  WT: 66.4 kg (146 lb 6.2 oz)  BMI:       Physical Exam:  General: Well developed, well nourished, anxious appearing   HEENT: Normocephalic, MMM  CV: RR  Resp: NWOB  GI:  RUQ and epigastric tenderness to palpation. Abdomen soft, non-distended, no guarding, no rebound, no masses  MSK: No cyanosis, peripheral edema, moving all extremities spontaneously  Skin/wounds:  No rashes, ulcers, erythema  Neuro:  CNII-XII grossly intact, alert and oriented to person, place, and time    Labs:  WBC/Hgb/Hct/Plts:  8.44/12.9/37.6/267 (09/19 " 0419)  BUN/Cr/glu/ALT/AST/amyl/lip:  5.4/0.65/--/7/13/--/-- (09/19 0419)  Na/K/Cl/CO2:  138/3.8/105/24 (09/19 0419)    Diagnostic Results:  RUQ US:   Cholelithiasis.  No biliary dilatation or sonographic findings for cholecystitis.    ASSESSMENT & PLAN:    Bette Dangelo is a 33 y.o. female with gallstones. She has no signs of obstruction or infection. Unclear if reported pain is symptomatic gallstones or anxiety. HDS. DKA now resolved and pending placement at [facility].     - patient offered outpatient evaluation for elective laparoscopic cholecystectomy   - she is also free to reschedule her appointment with previously scheduled surgeon  - will discuss with staff possibility of laparoscopic cholecystectomy during this admission given her upcoming transfer to [facility]  - if there are any changes in exam or concerns, please do not hesitate to contact General Surgery team   - rest of care per primary     David Morris MD  [employer] General Surgery HO-1  18:22  09/19/2023

## 2023-09-20 NOTE — PROGRESS NOTES
Acute Care and General Surgery   Progress Note  Admit Date: 9/16/2023  HD#4  POD#* No surgery found *    Subjective:   Interval history:  AF, brief episodes of sinus tachycardia to 120s yesterday otherwise HDS  This morning she feels like she is going back into DKA  Reports continued pain worse with eating and sitting upright  Otherwise tolerating diet without nausea or vomiting    Home Meds:  Current Outpatient Medications   Medication Instructions    BAQSIMI 3 mg, Nasal, Daily PRN    cholecalciferol (vitamin D3) 50,000 Units, Oral, Every 7 days    cholecalciferol (vitamin D3) 50,000 Units, Oral    clindamycin (CLEOCIN) 300 MG capsule Oral    clomiPHENE (CLOMID) 50 mg tablet Oral    famotidine (PEPCID) 20 mg, Oral, 2 times daily    fish oil/borage/flax/om3,6,9 1 (OMEGA 3-6-9 ORAL) Oral    FLOVENT  mcg/actuation inhaler 2 puffs, Inhalation, 2 times daily    fluticasone propionate (FLONASE) 50 mcg/actuation nasal spray 1 spray, Each Nostril, Daily    hydrOXYzine pamoate (VISTARIL) 25 MG Cap Once as needed    insulin aspart U-100 (NOVOLOG) 100 unit/mL (3 mL) InPn pen Novolog Flexpen U-100 Insulin Take No date recorded No form recorded No frequency recorded No route recorded No set duration recorded No set duration amount recorded active No dosage strength recorded No dosage strength units of measure recorded    insulin aspart U-100 (NOVOLOG) 100 unit/mL injection 300 units every 3 days per insulin pump    insulin 16 Units, Subcutaneous    meloxicam (MOBIC) 7.5 MG tablet 1 tablet, Oral, Nightly    meloxicam (MOBIC) 7.5 mg, Oral, Nightly    ondansetron (ZOFRAN) 8 mg, Oral, Every 8 hours PRN    ondansetron (ZOFRAN-ODT) 4 mg, Oral, Every 6 hours PRN      Scheduled Meds:   enoxparin  40 mg Subcutaneous Daily    insulin aspart U-100  4 Units Subcutaneous TIDWM    insulin detemir U-100  10 Units Subcutaneous QHS    insulin detemir U-100  5 Units Subcutaneous Daily    metoprolol succinate  12.5 mg Oral Daily     "QUEtiapine  50 mg Oral BID     Continuous Infusions:   sodium chloride 0.9% 100 mL/hr at 09/20/23 0630    dextrose 5 % and 0.45 % NaCl Stopped (09/16/23 2328)     PRN Meds:ALPRAZolam, dextrose 10 % in water (D10W), dextrose 10 % in water (D10W), dextrose 10%, dextrose 10%, dextrose 10%, dextrose 10%, dextrose 5 % and 0.45 % NaCl, glucagon (human recombinant), glucose, glucose, hydrALAZINE, hydrOXYzine pamoate, insulin aspart U-100, labetalol, melatonin, metoprolol, sodium chloride 0.9%, sodium chloride 0.9%     Objective:     VITAL SIGNS: 24 HR MIN & MAX LAST   Temp  Min: 97.4 °F (36.3 °C)  Max: 98.4 °F (36.9 °C)  98.1 °F (36.7 °C)   BP  Min: 139/85  Max: 152/92  (!) 146/95    Pulse  Min: 82  Max: 121  82    Resp  Min: 15  Max: 15  15    SpO2  Min: 96 %  Max: 99 %  98 %      HT: 5' 7" (170.2 cm)  WT: 66.4 kg (146 lb 6.2 oz)  BMI:       Intake/output:  Intake/Output - Last 3 Shifts         09/18 0700 09/19 0659 09/19 0700 09/20 0659 09/20 0700 09/21 0659    P.O. 1210 480     I.V. (mL/kg) 950 (14.3) 3312.2 (49.9)     Total Intake(mL/kg) 2160 (32.5) 3792.2 (57.1)     Urine (mL/kg/hr)       Total Output       Net +2160 +3792.2            Urine Occurrence 6 x 5 x     Stool Occurrence 0 x              Intake/Output Summary (Last 24 hours) at 9/20/2023 0711  Last data filed at 9/20/2023 0630  Gross per 24 hour   Intake 3792.15 ml   Output --   Net 3792.15 ml         Lines/drains/airway:       Peripheral IV - Single Lumen 09/16/23 1020 20 G Left;Posterior Hand (Active)   Site Assessment Clean;Dry;Intact;No redness;No swelling 09/20/23 0400   Extremity Assessment Distal to IV No abnormal discoloration 09/19/23 2000   Line Status Infusing 09/20/23 0400   Dressing Status Clean;Dry;Intact 09/20/23 0400   Dressing Intervention Integrity maintained 09/20/23 0400   Number of days: 3       Physical examination:  General: Well developed, well nourished, anxious appearing   HEENT: Normocephalic, MMM  CV: RR  Resp: NWOB  GI:  RUQ " and epigastric tenderness to palpation. Abdomen soft, non-distended, no guarding, no rebound, no surgical scars  MSK: No cyanosis, peripheral edema, moving all extremities spontaneously  Skin/wounds:  No rashes, ulcers, erythema  Neuro:  CNII-XII grossly intact, alert and oriented to person, place, and time    Labs:  WBC/Hgb/Hct/Plts:  6.76/12.5/36.8/241 (09/20 0351)  Na/K/Cl/CO2:  138/4.1/106/23 (09/20 0351)  BUN/Cr/glu/ALT/AST/amyl/lip:  6.9/0.72/--/6/10/--/-- (09/20 0351)     Assessment & Plan:   Bette Dangelo is a 33 y.o. female with gallstones. She has no signs of obstruction or infection. Unclear if reported pain is symptomatic gallstones or anxiety. She was scheduled for elective laparoscopic cholecystectomy on 9/21 at [facility]. HDS. DKA now resolved and pending placement at psychiatric hospital.     - discussed risks and benefits of laparoscopic cholecystectomy with patient, as well as the possibility that it does not completely resolve her pain  - she is amenable to surgery at this time  - made NPO  - consent to be obtained   - plan for laparoscopic cholecystectomy today  - primary team notified of operative plans  - rest of care per primary team    David Morris MD  [facility] General Surgery HO-1  7:11 AM

## 2023-09-20 NOTE — PT/OT/SLP EVAL
Occupational Therapy   Evaluation and Discharge Note    Name: Bette Dangelo  MRN: [id number]  Admitting Diagnosis: DKA, gallstones, anxiety disorder  Recent Surgery: * No surgery found *      Recommendations:     Discharge Recommendations: psychiatric facility  Discharge Equipment Recommendations: none  Barriers to discharge:  None    Assessment:     Bette Dangelo is a 33 y.o. female with a medical diagnosis of   Patient Active Problem List   Diagnosis    Bipolar 1 disorder    Primary hypertension    Type 1 diabetes mellitus with hypoglycemia    Vitamin D deficiency     . At this time, patient is functioning at their prior level of function and does not require further acute OT services.     Plan:     During this hospitalization, patient does not require further acute OT services.  Please re-consult if situation changes.    Plan of Care Reviewed with: patient, spouse    Subjective     Chief Complaint: anxiety over pending abdominal surgery, mild c/o pain in lower abdomen aen and LLQ  Patient/Family Comments/goals: pt would like surgery asap d/t anxiety increasing with surgery pending and c/o abdominal pain    Occupational Profile:  Living Environment: pt lives at home with her   Previous level of function: I with ADLs and mobility  Roles and Routines: pt works as a rehab counselor, drives herself, I with IADLs.  Pt was admitted to inpatient psych rehab at [facility] but required transfer to this facility d/t DM and HR issues.  Equipment Used at home: none  Assistance upon Discharge:  Hans at home, however anticipating DC to [facility] hospital from this facility    Pain/Comfort:  Pain Rating 1: 3/10  Location - Side 1: Left  Location - Orientation 1: lower  Location 1: abdomen  Pain Addressed 1: Reposition, Cessation of Activity  Pain Rating Post-Intervention 1: 2/10    Patients cultural, spiritual, Taoism conflicts given the current situation: no    Objective:      Communicated with: nurse Gilmore prior to session.  Patient found sitting edge of bed with peripheral IV, telemetry upon OT entry to room.    General Precautions: Standard,    Orthopedic Precautions: N/A  Braces: N/A  Respiratory Status: Room air     Occupational Performance:    Bed Mobility:    Patient completed Supine to Sit with independence    Functional Mobility/Transfers:  Patient completed Sit <> Stand Transfer with independence  with  no assistive device   Functional Mobility: modified independence for assist to manage IV pole when ambulating and for extra time to complete activities    Activities of Daily Living:  SPV for safety d/t psych deficits    Cognitive/Visual Perceptual:  Cognitive/Psychosocial Skills:     -       Oriented to: Person, Place, Time, and Situation   -       Follows Commands/attention:Follows multistep  commands  -       Safety awareness/insight to disability: intact   -       Mood/Affect/Coping skills/emotional control: Despondent, Anxious, Flat affect, Pleasant, Guarded, and Withdrawn    Physical Exam:  BUE AROM WNL, strength 4/5 with deconditioning noted  Sensation and FM coordination intact    Treatment & Education:  Pt. educated on orientation to environment, use of call bell for assist as needed, and encouraged to ambulate in room and in beebe with  for supervision and to manage IV pole; both verbalized understanding.      Patient left sitting edge of bed with all lines intact, call button in reach, nurse notified, and pt's  present    GOALS:   Multidisciplinary Problems       Occupational Therapy Goals       Not on file                    History:     Past Medical History:   Diagnosis Date    Diabetes mellitus type I     TMJ (dislocation of temporomandibular joint)     Vitamin D deficiency          Past Surgical History:   Procedure Laterality Date    BACK SURGERY      1999       Time Tracking:     OT Date of Treatment: 09/20/23  OT Start Time: 0848  OT Stop Time:  0904  OT Total Time (min): 16 min    Billable Minutes:Evaluation 16    9/20/2023

## 2023-09-21 LAB
ALBUMIN SERPL-MCNC: 3.6 G/DL (ref 3.5–5)
ALBUMIN/GLOB SERPL: 1.3 RATIO (ref 1.1–2)
ALP SERPL-CCNC: 48 UNIT/L (ref 40–150)
ALT SERPL-CCNC: 29 UNIT/L (ref 0–55)
AST SERPL-CCNC: 36 UNIT/L (ref 5–34)
BASOPHILS # BLD AUTO: 0.03 X10(3)/MCL
BASOPHILS NFR BLD AUTO: 0.3 %
BILIRUB SERPL-MCNC: 0.7 MG/DL
BUN SERPL-MCNC: 5.9 MG/DL (ref 7–18.7)
CALCIUM SERPL-MCNC: 8.9 MG/DL (ref 8.4–10.2)
CHLORIDE SERPL-SCNC: 105 MMOL/L (ref 98–107)
CO2 SERPL-SCNC: 21 MMOL/L (ref 22–29)
CREAT SERPL-MCNC: 0.72 MG/DL (ref 0.55–1.02)
EOSINOPHIL # BLD AUTO: 0.02 X10(3)/MCL (ref 0–0.9)
EOSINOPHIL NFR BLD AUTO: 0.2 %
ERYTHROCYTE [DISTWIDTH] IN BLOOD BY AUTOMATED COUNT: 11.9 % (ref 11.5–17)
GFR SERPLBLD CREATININE-BSD FMLA CKD-EPI: >60 MLS/MIN/1.73/M2
GLOBULIN SER-MCNC: 2.7 GM/DL (ref 2.4–3.5)
GLUCOSE SERPL-MCNC: 194 MG/DL (ref 74–100)
HCT VFR BLD AUTO: 35.2 % (ref 37–47)
HGB BLD-MCNC: 11.8 G/DL (ref 12–16)
IMM GRANULOCYTES # BLD AUTO: 0.02 X10(3)/MCL (ref 0–0.04)
IMM GRANULOCYTES NFR BLD AUTO: 0.2 %
LYMPHOCYTES # BLD AUTO: 2 X10(3)/MCL (ref 0.6–4.6)
LYMPHOCYTES NFR BLD AUTO: 19.7 %
MCH RBC QN AUTO: 29.9 PG (ref 27–31)
MCHC RBC AUTO-ENTMCNC: 33.5 G/DL (ref 33–36)
MCV RBC AUTO: 89.1 FL (ref 80–94)
MONOCYTES # BLD AUTO: 0.65 X10(3)/MCL (ref 0.1–1.3)
MONOCYTES NFR BLD AUTO: 6.4 %
NEUTROPHILS # BLD AUTO: 7.45 X10(3)/MCL (ref 2.1–9.2)
NEUTROPHILS NFR BLD AUTO: 73.2 %
NRBC BLD AUTO-RTO: 0 %
PLATELET # BLD AUTO: 211 X10(3)/MCL (ref 130–400)
PMV BLD AUTO: 9.9 FL (ref 7.4–10.4)
POCT GLUCOSE: 156 MG/DL (ref 70–110)
POCT GLUCOSE: 159 MG/DL (ref 70–110)
POCT GLUCOSE: 168 MG/DL (ref 70–110)
POCT GLUCOSE: 180 MG/DL (ref 70–110)
POCT GLUCOSE: 218 MG/DL (ref 70–110)
POTASSIUM SERPL-SCNC: 3.6 MMOL/L (ref 3.5–5.1)
PROT SERPL-MCNC: 6.3 GM/DL (ref 6.4–8.3)
RBC # BLD AUTO: 3.95 X10(6)/MCL (ref 4.2–5.4)
SODIUM SERPL-SCNC: 135 MMOL/L (ref 136–145)
WBC # SPEC AUTO: 10.17 X10(3)/MCL (ref 4.5–11.5)

## 2023-09-21 PROCEDURE — 85025 COMPLETE CBC W/AUTO DIFF WBC: CPT | Performed by: STUDENT IN AN ORGANIZED HEALTH CARE EDUCATION/TRAINING PROGRAM

## 2023-09-21 PROCEDURE — 25000003 PHARM REV CODE 250: Performed by: STUDENT IN AN ORGANIZED HEALTH CARE EDUCATION/TRAINING PROGRAM

## 2023-09-21 PROCEDURE — 21400001 HC TELEMETRY ROOM

## 2023-09-21 PROCEDURE — 63600175 PHARM REV CODE 636 W HCPCS: Performed by: INTERNAL MEDICINE

## 2023-09-21 PROCEDURE — 63600175 PHARM REV CODE 636 W HCPCS: Performed by: STUDENT IN AN ORGANIZED HEALTH CARE EDUCATION/TRAINING PROGRAM

## 2023-09-21 PROCEDURE — 25000003 PHARM REV CODE 250

## 2023-09-21 PROCEDURE — 80053 COMPREHEN METABOLIC PANEL: CPT | Performed by: STUDENT IN AN ORGANIZED HEALTH CARE EDUCATION/TRAINING PROGRAM

## 2023-09-21 RX ADMIN — INSULIN ASPART 4 UNITS: 100 INJECTION, SOLUTION INTRAVENOUS; SUBCUTANEOUS at 12:09

## 2023-09-21 RX ADMIN — METHOCARBAMOL 500 MG: 500 TABLET ORAL at 12:09

## 2023-09-21 RX ADMIN — QUETIAPINE FUMARATE 50 MG: 25 TABLET ORAL at 08:09

## 2023-09-21 RX ADMIN — GABAPENTIN 300 MG: 300 CAPSULE ORAL at 09:09

## 2023-09-21 RX ADMIN — GABAPENTIN 300 MG: 300 CAPSULE ORAL at 04:09

## 2023-09-21 RX ADMIN — ACETAMINOPHEN 1000 MG: 500 TABLET, FILM COATED ORAL at 11:09

## 2023-09-21 RX ADMIN — INSULIN ASPART 4 UNITS: 100 INJECTION, SOLUTION INTRAVENOUS; SUBCUTANEOUS at 04:09

## 2023-09-21 RX ADMIN — GABAPENTIN 300 MG: 300 CAPSULE ORAL at 08:09

## 2023-09-21 RX ADMIN — ACETAMINOPHEN 1000 MG: 500 TABLET, FILM COATED ORAL at 05:09

## 2023-09-21 RX ADMIN — METHOCARBAMOL 500 MG: 500 TABLET ORAL at 08:09

## 2023-09-21 RX ADMIN — ENOXAPARIN SODIUM 40 MG: 40 INJECTION SUBCUTANEOUS at 04:09

## 2023-09-21 RX ADMIN — INSULIN DETEMIR 10 UNITS: 100 INJECTION, SOLUTION SUBCUTANEOUS at 09:09

## 2023-09-21 RX ADMIN — INSULIN DETEMIR 5 UNITS: 100 INJECTION, SOLUTION SUBCUTANEOUS at 08:09

## 2023-09-21 RX ADMIN — QUETIAPINE FUMARATE 50 MG: 25 TABLET ORAL at 09:09

## 2023-09-21 RX ADMIN — METHOCARBAMOL 500 MG: 500 TABLET ORAL at 09:09

## 2023-09-21 RX ADMIN — METOPROLOL SUCCINATE 12.5 MG: 25 TABLET, EXTENDED RELEASE ORAL at 08:09

## 2023-09-21 RX ADMIN — METHOCARBAMOL 500 MG: 500 TABLET ORAL at 04:09

## 2023-09-21 RX ADMIN — SODIUM CHLORIDE: 9 INJECTION, SOLUTION INTRAVENOUS at 01:09

## 2023-09-21 RX ADMIN — AMLODIPINE BESYLATE 5 MG: 5 TABLET ORAL at 08:09

## 2023-09-21 NOTE — PROGRESS NOTES
[facility] Medicine  Progress Note      Patient Name: Bette Dangelo  : 1990  MRN: [id number]  Patient Class: IP- Inpatient   Admission Date: 2023   Length of Stay: 5  Admitting Service: Hospital Medicine  Attending Physician: Obie Washington MD  PCP: Sunshine Calderon FNP    CHIEF COMPLAINT     Chief Complaint   Patient presents with    Altered Mental Status     Pt was inpt at [facility] for voluntary psych admission.  Pt became confused, guarded, and non verbal .  Hx of DM.   initially.   on arrival to er.  Hx of dka as well       HOSPITAL COURSE     HPI:   Pt is a 32yo F with PMH bipolar 1, T1DM, and HTN who presents to ED for control of her DM from [facility]. Reports feeling unwell over past few days. Trying to get to appt for outpatient cholecystectomy after finding +gallstones as cause of abd pain, nausea with meals. Has not been able to eat per usual. Not currently on any medications for bipolar disorder. Only on insulin pump for DM, unsure if pump has been working as intended. Was cleared by [facility] yesterday for admission to [facility], however, CBG continued to worsen and was >350 today. Reports +anxiety, however, denies any fevers, chills. +polyuria and polydipsia; h/o dka in the past.      In ED, pt was AF, HR mildly elevated and s/p ativan 0.5mg. BOHB 5.0, UA with +ketones, +metabolic acidosis with AG 18. VBG with pH 7.4, insulin pump removed and placed with belongings.     Hospital Course/Significant events:  23 - admitted to ICU for DKA  23 - downgraded to floor status     24 Hour Interval History:  Pt now s/p lap nura and doing well. Pain seems to be at tolerable level, has not tried eating and not passing flatus yet. Plan is still to go to [facility] afterward. Would like to shower once incisions healed some more but before going to [facility]. Tachycardia resolved with metoprolol. BP controlled with amlodipine  "5mg. No feversl, sob, cp, n/v. Abd mostly at locations of incisions.    OBJECTIVE/PHYSICAL EXAM     VITAL SIGNS (MOST RECENT):  Temp: 99.5 °F (37.5 °C) (09/21/23 1514)  Pulse: 100 (09/21/23 1514)  Resp: 18 (09/21/23 1514)  BP: 139/66 (09/21/23 1514)  SpO2: 98 % (09/21/23 1514) VITAL SIGNS (24 HOUR RANGE):  Temp:  [98.2 °F (36.8 °C)-99.5 °F (37.5 °C)]   Pulse:  []   Resp:  [18]   BP: (134-144)/(66-97)   SpO2:  [98 %]      Physical Exam  Constitutional:       Appearance: She is normal weight.   HENT:      Head: Normocephalic and atraumatic.      Mouth/Throat:      Mouth: Mucous membranes are dry.      Pharynx: Oropharynx is clear.   Eyes:      Extraocular Movements: Extraocular movements intact.   Cardiovascular:      Rate and Rhythm: Regular rhythm. Tachycardia present.      Heart sounds: Normal heart sounds. No murmur heard.  Pulmonary:      Effort: Pulmonary effort is normal.      Breath sounds: Normal breath sounds.   Abdominal:      General: Abdomen is flat. Bowel sounds are normal.      Palpations: Abdomen is soft.      Comments: Nontender. Incisions appear well-healed. Dermabond intact   Musculoskeletal:      Cervical back: Neck supple.   Skin:     General: Skin is warm.      Capillary Refill: Capillary refill takes less than 2 seconds.   Neurological:      Mental Status: She is alert and oriented to person, place, and time.         LABS/MICRO/MEDS/DIAGNOSTICS     LABS  CBC  Recent Labs     09/20/23  0351 09/21/23  0354   WBC 6.76 10.17   RBC 4.18* 3.95*   HGB 12.5 11.8*   HCT 36.8* 35.2*   MCV 88.0 89.1   MCH 29.9 29.9   MCHC 34.0 33.5   RDW 11.8 11.9    211       Anemia  No results for input(s): "HAPTOGLOBIN", "FERRITIN", "IRON", "TIBC", "[id number]", "FOLATE" in the last 72 hours.  Coags  No results for input(s): "INR", "APTT", "D-DIMER" in the last 72 hours.  Cardiac  No results for input(s): "BNP", "CPK", "TROPONINI" in the last 72 hours.  ABG/Lactate  No results for input(s): "PH", "PCO2", " ""PO2", "HCO3", "POCSATURATED", "BE", "LACTIC" in the last 72 hours.    BMP  Recent Labs     09/20/23  0351 09/21/23  0354    135*   K 4.1 3.6   CHLORIDE 106 105   CO2 23 21*   BUN 6.9* 5.9*   CREATININE 0.72 0.72   GLUCOSE 201* 194*   CALCIUM 9.2 8.9       LFTs  Recent Labs     09/20/23  0351 09/21/23  0354   ALBUMIN 3.8 3.6   GLOBULIN 3.1 2.7   ALKPHOS 50 48   ALT 6 29   AST 10 36*   BILITOT 0.5 0.7       Inflammatory Markers  No results for input(s): "CRP", "LDH", "ESR" in the last 72 hours.  Lipid  No results for input(s): "CHOL", "TRIG", "LDL", "VLDL", "HDL" in the last 72 hours.  Diabetes  Recent Labs     09/19/23  0419 09/20/23  0351 09/21/23  0354   GLUCOSE 179* 201* 194*       Thyroid  No results for input(s): "TSH", "FREET4" in the last 72 hours.     INTAKE/OUTPUT    Intake/Output Summary (Last 24 hours) at 9/21/2023 1556  Last data filed at 9/21/2023 0636  Gross per 24 hour   Intake 2542.28 ml   Output --   Net 2542.28 ml          MICROBIOLOGY  Microbiology Results (last 7 days)       ** No results found for the last 168 hours. **             MEDICATIONS   acetaminophen  1,000 mg Oral Q6H    amLODIPine  5 mg Oral Daily    enoxparin  40 mg Subcutaneous Daily    gabapentin  300 mg Oral TID    insulin aspart U-100  4 Units Subcutaneous TIDWM    insulin detemir U-100  10 Units Subcutaneous QHS    insulin detemir U-100  5 Units Subcutaneous Daily    LIDOcaine (PF) 10 mg/ml (1%)  1 mL Intradermal Once    methocarbamoL  500 mg Oral QID    metoprolol succinate  12.5 mg Oral Daily    QUEtiapine  50 mg Oral BID         INFUSIONS   sodium chloride 0.9% 100 mL/hr at 09/21/23 0636    dextrose 5 % and 0.45 % NaCl Stopped (09/16/23 2429)    lactated ringers 250 mL/hr at 09/20/23 1553    lactated ringers          DIAGNOSTIC TESTS  No orders to display          No results found for: "EF"       ASSESSMENT/PLAN   Assessment  DKA (mild) in setting of T1DM - resolved  Sinus tachycardia  Bipolar 1 disorder  Cholelithiasis " s/p laparoscopic cholecystectomy     Plan  S/p lap nura. Doing well. Will try to eat this am.   Psych rec'd voluntary admission to inpatient psych facility, however low threshold to PEC. Pt plan still to go to [facility] once medically cleared  CBGs improved, cont detemir 15 at night, will cont to titrate as needed. Pt likely will need to be on basal bolus regimen while at inpatient psych facility, pt voiced understanding  Cont seroquel. Will add on seroquel prn or zyporexa forprn for agitation, stop ativan due to poor response prev  cont norvasc 5mg for HTN     DVT Prophylaxis: lovenox  GI Prophylaxis: n/a    Dispo: to [facility] once medically cleared from surgery. CM consulted.    Ami Jeffries MD  [facility]

## 2023-09-21 NOTE — PROGRESS NOTES
[facility] Medicine  Progress Note      Patient Name: Bette Dangelo  : 1990  MRN: [id number]  Patient Class: IP- Inpatient   Admission Date: 2023   Length of Stay: 4  Admitting Service: Hospital Medicine  Attending Physician: Obie Washington MD  PCP: Sunshine Calderon FNP    CHIEF COMPLAINT     Chief Complaint   Patient presents with    Altered Mental Status     Pt was inpt at [facility] for voluntary psych admission.  Pt became confused, guarded, and non verbal .  Hx of DM.   initially.   on arrival to er.  Hx of dka as well       HOSPITAL COURSE     HPI:   Pt is a 34yo F with PMH bipolar 1, T1DM, and HTN who presents to ED for control of her DM from [facility]. Reports feeling unwell over past few days. Trying to get to appt for outpatient cholecystectomy after finding +gallstones as cause of abd pain, nausea with meals. Has not been able to eat per usual. Not currently on any medications for bipolar disorder. Only on insulin pump for DM, unsure if pump has been working as intended. Was cleared by [facility] yesterday for admission to [facility], however, CBG continued to worsen and was >350 today. Reports +anxiety, however, denies any fevers, chills. +polyuria and polydipsia; h/o dka in the past.      In ED, pt was AF, HR mildly elevated and s/p ativan 0.5mg. BOHB 5.0, UA with +ketones, +metabolic acidosis with AG 18. VBG with pH 7.4, insulin pump removed and placed with belongings.     Hospital Course/Significant events:  23 - admitted to ICU for DKA  23 - downgraded to floor status     24 Hour Interval History:  NAEON. Pt remains high risk of PEC, continues to be anxious. Tachycardia resolved with metoprolol. BP still mildly elevated. States she doesn't feel medically well. Surgery with possible cholecystectomy today. Pt otherwise denies any CP, SOB, nausea, vomiting. Reports urinating frequently but still on IV fluids  "at 100cc/hr. Not amenable to decreasing rate. Pt worried about IV not working as she is not currently feeling cold with iv fluids running.    OBJECTIVE/PHYSICAL EXAM     VITAL SIGNS (MOST RECENT):  Temp: 98.7 °F (37.1 °C) (09/20/23 2007)  Pulse: 109 (09/20/23 2007)  Resp: 18 (09/20/23 1653)  BP: 119/74 (09/20/23 2007)  SpO2: 97 % (09/20/23 2007) VITAL SIGNS (24 HOUR RANGE):  Temp:  [97.3 °F (36.3 °C)-98.7 °F (37.1 °C)]   Pulse:  []   Resp:  [18-20]   BP: (118-154)/()   SpO2:  [96 %-100 %]      Physical Exam  Constitutional:       Appearance: She is normal weight.      Comments: Appears anxious, poor eye contact   HENT:      Head: Normocephalic and atraumatic.      Mouth/Throat:      Mouth: Mucous membranes are dry.      Pharynx: Oropharynx is clear.   Eyes:      Extraocular Movements: Extraocular movements intact.   Cardiovascular:      Rate and Rhythm: Regular rhythm. Tachycardia present.      Heart sounds: Normal heart sounds. No murmur heard.  Pulmonary:      Effort: Pulmonary effort is normal.      Breath sounds: Normal breath sounds.   Abdominal:      General: Abdomen is flat.      Palpations: Abdomen is soft.      Tenderness: There is abdominal tenderness.      Comments: Diffuse tenderness   Musculoskeletal:      Cervical back: Neck supple.   Skin:     General: Skin is warm.      Capillary Refill: Capillary refill takes less than 2 seconds.   Neurological:      Mental Status: She is alert and oriented to person, place, and time.   Psychiatric:      Comments: +anxiety, bordering paranoia         LABS/MICRO/MEDS/DIAGNOSTICS     LABS  CBC  Recent Labs     09/19/23  0419 09/20/23  0351   WBC 8.44 6.76   RBC 4.32 4.18*   HGB 12.9 12.5   HCT 37.6 36.8*   MCV 87.0 88.0   MCH 29.9 29.9   MCHC 34.3 34.0   RDW 11.7 11.8    241     Anemia  No results for input(s): "HAPTOGLOBIN", "FERRITIN", "IRON", "TIBC", "[id number]", "FOLATE" in the last 72 hours.  Coags  No results for input(s): "INR", "APTT", " ""D-DIMER" in the last 72 hours.  Cardiac  No results for input(s): "BNP", "CPK", "TROPONINI" in the last 72 hours.  ABG/Lactate  No results for input(s): "PH", "PCO2", "PO2", "HCO3", "POCSATURATED", "BE", "LACTIC" in the last 72 hours.    BMP  Recent Labs     09/19/23  0419 09/20/23  0351    138   K 3.8 4.1   CHLORIDE 105 106   CO2 24 23   BUN 5.4* 6.9*   CREATININE 0.65 0.72   GLUCOSE 179* 201*   CALCIUM 9.7 9.2     LFTs  Recent Labs     09/19/23 0419 09/20/23  0351   ALBUMIN 4.0 3.8   GLOBULIN 3.2 3.1   ALKPHOS 50 50   ALT 7 6   AST 13 10   BILITOT 0.5 0.5     Inflammatory Markers  No results for input(s): "CRP", "LDH", "ESR" in the last 72 hours.  Lipid  No results for input(s): "CHOL", "TRIG", "LDL", "VLDL", "HDL" in the last 72 hours.  Diabetes  Recent Labs     09/18/23  0420 09/19/23  0419 09/20/23  0351   GLUCOSE 216* 179* 201*     Thyroid  No results for input(s): "TSH", "FREET4" in the last 72 hours.     INTAKE/OUTPUT    Intake/Output Summary (Last 24 hours) at 9/20/2023 2211  Last data filed at 9/20/2023 1628  Gross per 24 hour   Intake 3030.57 ml   Output --   Net 3030.57 ml        MICROBIOLOGY  Microbiology Results (last 7 days)       ** No results found for the last 168 hours. **             MEDICATIONS   acetaminophen  1,000 mg Oral Q6H    amLODIPine  5 mg Oral Daily    diphenhydrAMINE        enoxparin  40 mg Subcutaneous Daily    gabapentin  300 mg Oral TID    HYDROmorphone        insulin aspart U-100  4 Units Subcutaneous TIDWM    insulin detemir U-100  10 Units Subcutaneous QHS    insulin detemir U-100  5 Units Subcutaneous Daily    LIDOcaine (PF) 10 mg/ml (1%)  1 mL Intradermal Once    methocarbamoL  500 mg Oral QID    metoprolol succinate  12.5 mg Oral Daily    QUEtiapine  50 mg Oral BID         INFUSIONS   sodium chloride 0.9% 100 mL/hr at 09/20/23 1216    dextrose 5 % and 0.45 % NaCl Stopped (09/16/23 0136)    lactated ringers 250 mL/hr at 09/20/23 1553    lactated ringers      " "    DIAGNOSTIC TESTS  No orders to display        No results found for: "EF"       ASSESSMENT/PLAN   Assessment  DKA (mild) in setting of T1DM - resolved  Sinus tachycardia  Bipolar 1 disorder  Cholelithiasis     Plan  Extensive discussion with pt regarding care. Pt ultimately feels need for continued care and does not feel well.   Psych rec'd voluntary admission to inpatient psych facility, however low threshold to PEC. Pt amenable, however, still very concerned regarding her gallstones  CBGs improved, cont detemir 15 at night, will cont to titrate as needed. Pt likely will need to be on basal bolus regimen while at inpatient psych facility, pt voiced understanding  Cont seroquel. Will add on seroquel prn or zyporexa forprn for agitation, stop ativan due to poor response prev  Abd US with +cholelithiasis without cholecystitis. Will cont to monitor for now. Consult surgery if sxs evolve or if new sxs develop  Adding norvasc 5mg for HTN     DVT Prophylaxis: lovenox  GI Prophylaxis: n/a    Dispo: notified by surgery on rounds - pt will get cholecystectomy today, likely this evening. CM consulted for inpatient psych placement after surgery     Ami Jeffries MD  [facility]  "

## 2023-09-21 NOTE — PROGRESS NOTES
Acute Care and General Surgery   Progress Note  Admit Date: 9/16/2023  HD#5  POD#1 Day Post-Op    Subjective:   Interval history:  Now s/p laparoscopic cholecystectomy for gallstones  No acute complaints, doing well  Pain well controlled  Tolerating diet without N/V   Incision sites clean/dry/intact   Ambulating OOB     Home Meds:  Current Outpatient Medications   Medication Instructions    BAQSIMI 3 mg, Nasal, Daily PRN    cholecalciferol (vitamin D3) 50,000 Units, Oral, Every 7 days    cholecalciferol (vitamin D3) 50,000 Units, Oral    clindamycin (CLEOCIN) 300 MG capsule Oral    clomiPHENE (CLOMID) 50 mg tablet Oral    famotidine (PEPCID) 20 mg, Oral, 2 times daily    fish oil/borage/flax/om3,6,9 1 (OMEGA 3-6-9 ORAL) Oral    FLOVENT  mcg/actuation inhaler 2 puffs, Inhalation, 2 times daily    fluticasone propionate (FLONASE) 50 mcg/actuation nasal spray 1 spray, Each Nostril, Daily    hydrOXYzine pamoate (VISTARIL) 25 MG Cap Once as needed    insulin aspart U-100 (NOVOLOG) 100 unit/mL (3 mL) InPn pen Novolog Flexpen U-100 Insulin Take No date recorded No form recorded No frequency recorded No route recorded No set duration recorded No set duration amount recorded active No dosage strength recorded No dosage strength units of measure recorded    insulin aspart U-100 (NOVOLOG) 100 unit/mL injection 300 units every 3 days per insulin pump    insulin 16 Units, Subcutaneous    meloxicam (MOBIC) 7.5 MG tablet 1 tablet, Oral, Nightly    meloxicam (MOBIC) 7.5 mg, Oral, Nightly    ondansetron (ZOFRAN) 8 mg, Oral, Every 8 hours PRN    ondansetron (ZOFRAN-ODT) 4 mg, Oral, Every 6 hours PRN      Scheduled Meds:   acetaminophen  1,000 mg Oral Q6H    amLODIPine  5 mg Oral Daily    enoxparin  40 mg Subcutaneous Daily    gabapentin  300 mg Oral TID    insulin aspart U-100  4 Units Subcutaneous TIDWM    insulin detemir U-100  10 Units Subcutaneous QHS    insulin detemir U-100  5 Units Subcutaneous Daily    LIDOcaine (PF)  "10 mg/ml (1%)  1 mL Intradermal Once    methocarbamoL  500 mg Oral QID    metoprolol succinate  12.5 mg Oral Daily    QUEtiapine  50 mg Oral BID     Continuous Infusions:   sodium chloride 0.9% 100 mL/hr at 09/21/23 0147    dextrose 5 % and 0.45 % NaCl Stopped (09/16/23 2328)    lactated ringers 250 mL/hr at 09/20/23 1553    lactated ringers       PRN Meds:dextrose 10 % in water (D10W), dextrose 10 % in water (D10W), dextrose 10%, dextrose 10%, dextrose 10%, dextrose 10%, dextrose 5 % and 0.45 % NaCl, glucagon (human recombinant), glucose, glucose, hydrALAZINE, hydrOXYzine pamoate, insulin aspart U-100, labetalol, melatonin, metoprolol, morphine, OLANZapine, oxyCODONE, sodium chloride 0.9%, sodium chloride 0.9%     Objective:     VITAL SIGNS: 24 HR MIN & MAX LAST   Temp  Min: 97.3 °F (36.3 °C)  Max: 99.5 °F (37.5 °C)  99.5 °F (37.5 °C)   BP  Min: 118/80  Max: 154/107  134/88    Pulse  Min: 61  Max: 112  85    Resp  Min: 15  Max: 20  18    SpO2  Min: 96 %  Max: 100 %  98 %      HT: 5' 7" (170.2 cm)  WT: 66.4 kg (146 lb 6.2 oz)  BMI:       Intake/output:  Intake/Output - Last 3 Shifts         09/19 0700 09/20 0659 09/20 0700 09/21 0659    P.O. 480     I.V. (mL/kg) 2745.6 (41.3) 1500 (22.6)    Total Intake(mL/kg) 3225.6 (48.6) 1500 (22.6)    Net +3225.6 +1500          Urine Occurrence 5 x             Intake/Output Summary (Last 24 hours) at 9/21/2023 0556  Last data filed at 9/20/2023 1628  Gross per 24 hour   Intake 3030.57 ml   Output --   Net 3030.57 ml           Lines/drains/airway:       Peripheral IV - Single Lumen 09/16/23 1020 20 G Left;Posterior Hand (Active)   Site Assessment Clean;Dry;Intact;No redness;No swelling 09/20/23 0400   Extremity Assessment Distal to IV No abnormal discoloration 09/19/23 2000   Line Status Infusing 09/20/23 0400   Dressing Status Clean;Dry;Intact 09/20/23 0400   Dressing Intervention Integrity maintained 09/20/23 0400   Number of days: 3       Physical examination:  General: Well " developed, well nourished, anxious appearing   HEENT: Normocephalic, MMM  CV: RR  Resp: NWOB  GI:  appropriately tender; incision sites clean/dry/intact. Abdomen soft, non-distended, no guarding, no rebound.   MSK: No cyanosis, peripheral edema, moving all extremities spontaneously  Skin/wounds:  No rashes, ulcers, erythema  Neuro:  CNII-XII grossly intact, alert and oriented to person, place, and time    Labs:  WBC/Hgb/Hct/Plts:  10.17/11.8/35.2/211 (09/21 0354)  Na/K/Cl/CO2:  135/3.6/105/21 (09/21 0354)  BUN/Cr/glu/ALT/AST/amyl/lip:  5.9/0.72/--/29/36/--/-- (09/21 0354)     Assessment & Plan:   Bette Dangelo is a 33 y.o. female with symptomatic gallstones. No signs of obstruction or infection. DKA now resolved and pending placement at [facility]. Now 1 Day Post-Op s/p laparoscopic cholecystectomy. HDS. Doing well postop.     - MMPC placed for postoperative pain  - will set up postop follow up in General Surgery clinic   - please do not hesitate to contact us for any acute changes in exam, questions, or concerns   - rest of care per primary team    David Morris MD  [facility] General [facility]  5:59 AM 09/21/2023

## 2023-09-21 NOTE — MEDICAL/APP STUDENT
Acute Care and General Surgery   Progress Note  Admit Date: 9/16/2023  HD#5  POD#1 Day Post-Op    Subjective:   Interval history:  NAEON. Doing well postoperatively. Patient is eating and drinking without difficulty. She has been able to get up to use the restroom. Her only complaint this AM is that she feels warm all over.     Home Meds:  Current Outpatient Medications   Medication Instructions    BAQSIMI 3 mg, Nasal, Daily PRN    cholecalciferol (vitamin D3) 50,000 Units, Oral, Every 7 days    cholecalciferol (vitamin D3) 50,000 Units, Oral    clindamycin (CLEOCIN) 300 MG capsule Oral    clomiPHENE (CLOMID) 50 mg tablet Oral    famotidine (PEPCID) 20 mg, Oral, 2 times daily    fish oil/borage/flax/om3,6,9 1 (OMEGA 3-6-9 ORAL) Oral    FLOVENT  mcg/actuation inhaler 2 puffs, Inhalation, 2 times daily    fluticasone propionate (FLONASE) 50 mcg/actuation nasal spray 1 spray, Each Nostril, Daily    hydrOXYzine pamoate (VISTARIL) 25 MG Cap Once as needed    insulin aspart U-100 (NOVOLOG) 100 unit/mL (3 mL) InPn pen Novolog Flexpen U-100 Insulin Take No date recorded No form recorded No frequency recorded No route recorded No set duration recorded No set duration amount recorded active No dosage strength recorded No dosage strength units of measure recorded    insulin aspart U-100 (NOVOLOG) 100 unit/mL injection 300 units every 3 days per insulin pump    insulin 16 Units, Subcutaneous    meloxicam (MOBIC) 7.5 MG tablet 1 tablet, Oral, Nightly    meloxicam (MOBIC) 7.5 mg, Oral, Nightly    ondansetron (ZOFRAN) 8 mg, Oral, Every 8 hours PRN    ondansetron (ZOFRAN-ODT) 4 mg, Oral, Every 6 hours PRN      Scheduled Meds:   acetaminophen  1,000 mg Oral Q6H    amLODIPine  5 mg Oral Daily    enoxparin  40 mg Subcutaneous Daily    gabapentin  300 mg Oral TID    insulin aspart U-100  4 Units Subcutaneous TIDWM    insulin detemir U-100  10 Units Subcutaneous QHS    insulin detemir U-100  5 Units Subcutaneous Daily     "LIDOcaine (PF) 10 mg/ml (1%)  1 mL Intradermal Once    methocarbamoL  500 mg Oral QID    metoprolol succinate  12.5 mg Oral Daily    QUEtiapine  50 mg Oral BID     Continuous Infusions:   sodium chloride 0.9% 100 mL/hr at 09/21/23 0636    dextrose 5 % and 0.45 % NaCl Stopped (09/16/23 2328)    lactated ringers 250 mL/hr at 09/20/23 1553    lactated ringers       PRN Meds:dextrose 10 % in water (D10W), dextrose 10 % in water (D10W), dextrose 10%, dextrose 10%, dextrose 10%, dextrose 10%, dextrose 5 % and 0.45 % NaCl, glucagon (human recombinant), glucose, glucose, hydrALAZINE, hydrOXYzine pamoate, insulin aspart U-100, labetalol, melatonin, metoprolol, morphine, OLANZapine, oxyCODONE, sodium chloride 0.9%, sodium chloride 0.9%     Objective:     VITAL SIGNS: 24 HR MIN & MAX LAST   Temp  Min: 97.3 °F (36.3 °C)  Max: 99.5 °F (37.5 °C)  99.5 °F (37.5 °C)   BP  Min: 118/80  Max: 154/107  134/88    Pulse  Min: 61  Max: 112  85    Resp  Min: 15  Max: 20  18    SpO2  Min: 96 %  Max: 100 %  98 %      HT: 5' 7" (170.2 cm)  WT: 66.4 kg (146 lb 6.2 oz)  BMI:       Intake/output:  Intake/Output - Last 3 Shifts         09/19 0700 09/20 0659 09/20 0700 09/21 0659 09/21 0700 09/22 0659    P.O. 480      I.V. (mL/kg) 2745.6 (41.3) 3542.3 (53.3)     Total Intake(mL/kg) 3225.6 (48.6) 3542.3 (53.3)     Net +3225.6 +3542.3            Urine Occurrence 5 x              Intake/Output Summary (Last 24 hours) at 9/21/2023 0730  Last data filed at 9/21/2023 0636  Gross per 24 hour   Intake 3542.28 ml   Output --   Net 3542.28 ml         Lines/drains/airway:       Peripheral IV - Single Lumen 09/16/23 1020 20 G Posterior;Right Hand (Active)   Site Assessment Clean;Dry;Intact 09/21/23 0600   Extremity Assessment Distal to IV No abnormal discoloration 09/21/23 0600   Line Status Flushed;Infusing 09/21/23 0600   Dressing Status Clean;Dry;Intact 09/21/23 0600   Dressing Intervention Integrity maintained 09/21/23 0600   Number of days: 4 "       Physical examination:  Gen: NAD, AAOx3, answering questions appropriately  HEENT:  CV: RR  Resp: NWOB  Abd: S/ND. Mild TTP over incision site. Incisions CDI.   Ext: moving all extremities spontaneously and purposefully  Neuro: CN II-XII grossly intact  Skin/wounds: Skin is warm. No rashes, ulcers, or erythema.     Labs:  WBC/Hgb/Hct/Plts:  10.17/11.8/35.2/211 (09/21 0354)  Na/K/Cl/CO2:  135/3.6/105/21 (09/21 0354)  BUN/Cr/glu/ALT/AST/amyl/lip:  5.9/0.72/--/29/36/--/-- (09/21 0354)     Imaging:  No new imaging.     Assessment & Plan:   Bette Dangelo is a 33 y.o. female with PMHx of bipolar 1, T1DM, and HTN who is POD1 from laparoscopic cholecystectomy. She is doing well postoperatively and reaching bench marks.     - Plan to discharge home today.   - Counseled patient on signs and symptoms that should prompt immediate medical evaluation.   - Patient should follow up in clinic in 2 weeks.     ROVERTO Lee

## 2023-09-22 VITALS
RESPIRATION RATE: 18 BRPM | OXYGEN SATURATION: 96 % | DIASTOLIC BLOOD PRESSURE: 90 MMHG | TEMPERATURE: 98 F | WEIGHT: 146.38 LBS | HEART RATE: 100 BPM | SYSTOLIC BLOOD PRESSURE: 129 MMHG | HEIGHT: 67 IN | BODY MASS INDEX: 22.98 KG/M2

## 2023-09-22 PROBLEM — E10.65 CONTROLLED TYPE 1 DIABETES MELLITUS WITH HYPERGLYCEMIA: Status: RESOLVED | Noted: 2023-09-20 | Resolved: 2023-09-22

## 2023-09-22 PROBLEM — E10.10 DIABETIC KETOACIDOSIS WITHOUT COMA ASSOCIATED WITH TYPE 1 DIABETES MELLITUS: Status: RESOLVED | Noted: 2023-09-22 | Resolved: 2023-09-22

## 2023-09-22 PROBLEM — E10.10 DIABETIC KETOACIDOSIS WITHOUT COMA ASSOCIATED WITH TYPE 1 DIABETES MELLITUS: Status: ACTIVE | Noted: 2023-09-22

## 2023-09-22 LAB
ALBUMIN SERPL-MCNC: 3.8 G/DL (ref 3.5–5)
ALBUMIN/GLOB SERPL: 1.3 RATIO (ref 1.1–2)
ALP SERPL-CCNC: 49 UNIT/L (ref 40–150)
ALT SERPL-CCNC: 26 UNIT/L (ref 0–55)
AST SERPL-CCNC: 23 UNIT/L (ref 5–34)
BASOPHILS # BLD AUTO: 0.03 X10(3)/MCL
BASOPHILS NFR BLD AUTO: 0.5 %
BILIRUB SERPL-MCNC: 0.4 MG/DL
BUN SERPL-MCNC: 7.4 MG/DL (ref 7–18.7)
CALCIUM SERPL-MCNC: 9.1 MG/DL (ref 8.4–10.2)
CHLORIDE SERPL-SCNC: 106 MMOL/L (ref 98–107)
CO2 SERPL-SCNC: 23 MMOL/L (ref 22–29)
CREAT SERPL-MCNC: 0.76 MG/DL (ref 0.55–1.02)
EOSINOPHIL # BLD AUTO: 0.1 X10(3)/MCL (ref 0–0.9)
EOSINOPHIL NFR BLD AUTO: 1.6 %
ERYTHROCYTE [DISTWIDTH] IN BLOOD BY AUTOMATED COUNT: 11.7 % (ref 11.5–17)
ESTROGEN SERPL-MCNC: NORMAL PG/ML
GFR SERPLBLD CREATININE-BSD FMLA CKD-EPI: >60 MLS/MIN/1.73/M2
GLOBULIN SER-MCNC: 2.9 GM/DL (ref 2.4–3.5)
GLUCOSE SERPL-MCNC: 165 MG/DL (ref 74–100)
HCT VFR BLD AUTO: 36 % (ref 37–47)
HGB BLD-MCNC: 12.1 G/DL (ref 12–16)
IMM GRANULOCYTES # BLD AUTO: 0.02 X10(3)/MCL (ref 0–0.04)
IMM GRANULOCYTES NFR BLD AUTO: 0.3 %
INSULIN SERPL-ACNC: NORMAL U[IU]/ML
LAB AP CLINICAL INFORMATION: NORMAL
LAB AP GROSS DESCRIPTION: NORMAL
LAB AP REPORT FOOTNOTES: NORMAL
LYMPHOCYTES # BLD AUTO: 2.03 X10(3)/MCL (ref 0.6–4.6)
LYMPHOCYTES NFR BLD AUTO: 32.3 %
MCH RBC QN AUTO: 29.5 PG (ref 27–31)
MCHC RBC AUTO-ENTMCNC: 33.6 G/DL (ref 33–36)
MCV RBC AUTO: 87.8 FL (ref 80–94)
MONOCYTES # BLD AUTO: 0.55 X10(3)/MCL (ref 0.1–1.3)
MONOCYTES NFR BLD AUTO: 8.8 %
NEUTROPHILS # BLD AUTO: 3.55 X10(3)/MCL (ref 2.1–9.2)
NEUTROPHILS NFR BLD AUTO: 56.5 %
NRBC BLD AUTO-RTO: 0 %
PLATELET # BLD AUTO: 211 X10(3)/MCL (ref 130–400)
PMV BLD AUTO: 9.9 FL (ref 7.4–10.4)
POCT GLUCOSE: 121 MG/DL (ref 70–110)
POCT GLUCOSE: 156 MG/DL (ref 70–110)
POTASSIUM SERPL-SCNC: 3.6 MMOL/L (ref 3.5–5.1)
PROT SERPL-MCNC: 6.7 GM/DL (ref 6.4–8.3)
RBC # BLD AUTO: 4.1 X10(6)/MCL (ref 4.2–5.4)
SODIUM SERPL-SCNC: 138 MMOL/L (ref 136–145)
T3RU NFR SERPL: NORMAL %
WBC # SPEC AUTO: 6.28 X10(3)/MCL (ref 4.5–11.5)

## 2023-09-22 PROCEDURE — 25000003 PHARM REV CODE 250: Performed by: STUDENT IN AN ORGANIZED HEALTH CARE EDUCATION/TRAINING PROGRAM

## 2023-09-22 PROCEDURE — 25000003 PHARM REV CODE 250

## 2023-09-22 PROCEDURE — 85025 COMPLETE CBC W/AUTO DIFF WBC: CPT | Performed by: STUDENT IN AN ORGANIZED HEALTH CARE EDUCATION/TRAINING PROGRAM

## 2023-09-22 PROCEDURE — 63600175 PHARM REV CODE 636 W HCPCS: Performed by: STUDENT IN AN ORGANIZED HEALTH CARE EDUCATION/TRAINING PROGRAM

## 2023-09-22 PROCEDURE — 63600175 PHARM REV CODE 636 W HCPCS: Performed by: INTERNAL MEDICINE

## 2023-09-22 PROCEDURE — 80053 COMPREHEN METABOLIC PANEL: CPT | Performed by: STUDENT IN AN ORGANIZED HEALTH CARE EDUCATION/TRAINING PROGRAM

## 2023-09-22 RX ORDER — INSULIN ASPART 100 [IU]/ML
4 INJECTION, SOLUTION INTRAVENOUS; SUBCUTANEOUS 3 TIMES DAILY
Qty: 3.6 ML | Refills: 11 | Status: SHIPPED | OUTPATIENT
Start: 2023-09-22 | End: 2024-09-21

## 2023-09-22 RX ORDER — GABAPENTIN 300 MG/1
300 CAPSULE ORAL 3 TIMES DAILY
Qty: 90 CAPSULE | Refills: 0 | Status: SHIPPED | OUTPATIENT
Start: 2023-09-22 | End: 2023-10-22

## 2023-09-22 RX ORDER — AMLODIPINE BESYLATE 5 MG/1
5 TABLET ORAL DAILY
Qty: 30 TABLET | Refills: 0 | Status: SHIPPED | OUTPATIENT
Start: 2023-09-23 | End: 2023-10-23

## 2023-09-22 RX ORDER — FAMOTIDINE 20 MG/1
20 TABLET, FILM COATED ORAL 2 TIMES DAILY
Status: DISCONTINUED | OUTPATIENT
Start: 2023-09-22 | End: 2023-09-22 | Stop reason: HOSPADM

## 2023-09-22 RX ORDER — QUETIAPINE FUMARATE 50 MG/1
50 TABLET, FILM COATED ORAL 2 TIMES DAILY
Qty: 60 TABLET | Refills: 0 | Status: SHIPPED | OUTPATIENT
Start: 2023-09-22 | End: 2023-10-22

## 2023-09-22 RX ORDER — METHOCARBAMOL 500 MG/1
500 TABLET, FILM COATED ORAL 4 TIMES DAILY
Qty: 40 TABLET | Refills: 0 | Status: SHIPPED | OUTPATIENT
Start: 2023-09-22 | End: 2023-10-02

## 2023-09-22 RX ORDER — METOPROLOL SUCCINATE 25 MG/1
12.5 TABLET, EXTENDED RELEASE ORAL DAILY
Qty: 15 TABLET | Refills: 0 | Status: SHIPPED | OUTPATIENT
Start: 2023-09-23 | End: 2023-10-23

## 2023-09-22 RX ADMIN — METHOCARBAMOL 500 MG: 500 TABLET ORAL at 12:09

## 2023-09-22 RX ADMIN — FAMOTIDINE 20 MG: 20 TABLET, FILM COATED ORAL at 08:09

## 2023-09-22 RX ADMIN — ACETAMINOPHEN 1000 MG: 500 TABLET, FILM COATED ORAL at 05:09

## 2023-09-22 RX ADMIN — INSULIN ASPART 4 UNITS: 100 INJECTION, SOLUTION INTRAVENOUS; SUBCUTANEOUS at 12:09

## 2023-09-22 RX ADMIN — SODIUM CHLORIDE: 9 INJECTION, SOLUTION INTRAVENOUS at 12:09

## 2023-09-22 RX ADMIN — INSULIN DETEMIR 5 UNITS: 100 INJECTION, SOLUTION SUBCUTANEOUS at 08:09

## 2023-09-22 RX ADMIN — QUETIAPINE FUMARATE 50 MG: 25 TABLET ORAL at 08:09

## 2023-09-22 RX ADMIN — AMLODIPINE BESYLATE 5 MG: 5 TABLET ORAL at 08:09

## 2023-09-22 RX ADMIN — METOPROLOL SUCCINATE 12.5 MG: 25 TABLET, EXTENDED RELEASE ORAL at 08:09

## 2023-09-22 RX ADMIN — INSULIN ASPART 4 UNITS: 100 INJECTION, SOLUTION INTRAVENOUS; SUBCUTANEOUS at 08:09

## 2023-09-22 RX ADMIN — GABAPENTIN 300 MG: 300 CAPSULE ORAL at 08:09

## 2023-09-22 RX ADMIN — METHOCARBAMOL 500 MG: 500 TABLET ORAL at 08:09

## 2023-09-22 NOTE — PLAN OF CARE
Spoke with [facility] intake [phone], they will call the pt since she is a voluntary admit & notify me with updates.

## 2023-09-23 NOTE — DISCHARGE SUMMARY
[facility] - [address] [place] [address] Medicine  Discharge Summary      Patient Name: Bette Dangelo  MRN: [id number]  ADÁN: [id number]  Patient Class: IP- Inpatient  Admission Date: 9/16/2023  Hospital Length of Stay: 6 days  Discharge Date and Time: 9/22/2023  4:23 PM  Attending Physician: Camila att. providers found   Discharging Provider: Ami Jeffries MD  Primary Care Provider: Sunshine Calderon FNP    Primary Care Team: Networked reference to record PCT     HPI:   Pt is a 32yo F with PMH bipolar 1, T1DM, and HTN who presents to ED for control of her DM from [facility]. Reports feeling unwell over past few days. Trying to get to appt for outpatient cholecystectomy after finding +gallstones as cause of abd pain, nausea with meals. Has not been able to eat per usual. Not currently on any medications for bipolar disorder. Only on insulin pump for DM, unsure if pump has been working as intended. Was cleared by [facility] yesterday for admission to [facility], however, CBG continued to worsen and was >350 today. Reports +anxiety, however, denies any fevers, chills. +polyuria and polydipsia; h/o dka in the past.      In ED, pt was AF, HR mildly elevated and s/p ativan 0.5mg. BOHB 5.0, UA with +ketones, +metabolic acidosis with AG 18. VBG with pH 7.4, insulin pump removed and placed with belongings.    Procedure(s) (LRB):  CHOLECYSTECTOMY, LAPAROSCOPIC (N/A)      Hospital Course:   Pt was admitted to ICU for treatment of DKA; gap closed with DKA protocol. CBGs remained controlled on basal-bolus insulin. Pt was later downgraded, however, was noted to have increased anxiety and, at times, bordering paranoia concerning for uncontrolled bipolar disorder, no SI/HI; pt did not meet criteria for PEC. Psych consulted and recommended pt start on seroquel 50mg and voluntary admission to inpatient psych facility. Pt was amenable to plan, however, had continued abd tenderness and pain. Discussed case  with surgery given cholelithiasis. Lap nura scheduled and completed 9/20/23. Pt tolerated procedure well without complications. Over subsequent days, pt gradually tolerated diet, passed flatus, pain controlled on po medications, and deemed stable for [facility] to [facility]. CM consulted to assist in transfer.     Physical Exam  Constitutional:       Appearance: She is normal weight.   HENT:      Head: Normocephalic and atraumatic.      Mouth/Throat:      Mouth: Mucous membranes are dry.      Pharynx: Oropharynx is clear.   Eyes:      Extraocular Movements: Extraocular movements intact.   Cardiovascular:      Rate and Rhythm: Regular rhythm. Tachycardia present.      Heart sounds: Normal heart sounds. No murmur heard.  Pulmonary:      Effort: Pulmonary effort is normal.      Breath sounds: Normal breath sounds.   Abdominal:      General: Abdomen is flat. Bowel sounds are normal.      Palpations: Abdomen is soft.      Comments: Nontender. Incisions appear c/d/i. Dermabond intact   Musculoskeletal:      Cervical back: Neck supple.   Skin:     General: Skin is warm.      Capillary Refill: Capillary refill takes less than 2 seconds.   Neurological:      Mental Status: She is alert and oriented to person, place, and time.     Goals of Care Treatment Preferences:  Code Status: Full Code      Consults:   Consults (From admission, onward)          Status Ordering Provider     Inpatient consult to Social Work/Case Management  Once        Provider:  (Not yet assigned)    Completed STUART JIN     Inpatient consult to Endocrinology  Once        Provider:  Shabbir Wilson MD Completed NGUYEN, LISA O     Inpatient consult to Social Work/Case Management  Once        Provider:  (Not yet assigned)    RAMILA Kinney     Inpatient consult to Psychiatry  Once        Provider:  David Hardy MD    Completed RAMILA JIN            No new Assessment & Plan notes have been filed under this hospital service  since the last note was generated.  Service: Hospital Medicine    Final Active Diagnoses:    Diagnosis Date Noted POA    PRINCIPAL PROBLEM:  Bipolar 1 disorder [F31.9] 02/17/2016 Yes      Problems Resolved During this Admission:    Diagnosis Date Noted Date Resolved POA    Diabetic ketoacidosis without coma associated with type 1 diabetes mellitus [E10.10] 09/22/2023 09/22/2023 Yes    Controlled type 1 diabetes mellitus with hyperglycemia [E10.65] 09/20/2023 09/22/2023 Yes       Discharged Condition: medically clear and stable     Disposition: Psychiatric Hospital    Follow Up:    Patient Instructions:      Ambulatory referral/consult to General Surgery   Standing Status: Future   Referral Priority: Routine Referral Type: Consultation   Referral Reason: Specialty Services Required   Requested Specialty: General Surgery   Number of Visits Requested: 1     Medications:  Reconciled Home Medications:      Medication List        START taking these medications      amLODIPine 5 MG tablet  Commonly known as: NORVASC  Take 1 tablet (5 mg total) by mouth once daily.  Start taking on: September 23, 2023     gabapentin 300 MG capsule  Commonly known as: NEURONTIN  Take 1 capsule (300 mg total) by mouth 3 (three) times daily.     * insulin detemir U-100 100 unit/mL injection  Commonly known as: Levemir  Inject 10 Units into the skin every evening.     * insulin detemir U-100 100 unit/mL injection  Commonly known as: Levemir  Inject 5 Units into the skin once daily.  Start taking on: September 23, 2023     methocarbamoL 500 MG Tab  Commonly known as: ROBAXIN  Take 1 tablet (500 mg total) by mouth 4 (four) times daily. for 10 days     metoprolol succinate 25 MG 24 hr tablet  Commonly known as: TOPROL-XL  Take 0.5 tablets (12.5 mg total) by mouth once daily.  Start taking on: September 23, 2023     QUEtiapine 50 MG tablet  Commonly known as: SEROQUEL  Take 1 tablet (50 mg total) by mouth 2 (two) times daily.           * This list  has 2 medication(s) that are the same as other medications prescribed for you. Read the directions carefully, and ask your doctor or other care provider to review them with you.                CHANGE how you take these medications      insulin aspart U-100 100 unit/mL injection  Commonly known as: NovoLOG  Inject 4 Units into the skin 3 (three) times daily.  What changed:   See the new instructions.  Another medication with the same name was removed. Continue taking this medication, and follow the directions you see here.            CONTINUE taking these medications      famotidine 20 MG tablet  Commonly known as: PEPCID  Take 1 tablet (20 mg total) by mouth 2 (two) times daily.     * fluticasone propionate 50 mcg/actuation nasal spray  Commonly known as: FLONASE  1 spray by Each Nostril route once daily.     * FLOVENT  mcg/actuation inhaler  Generic drug: fluticasone propionate  Inhale 2 puffs into the lungs 2 (two) times daily.     hydrOXYzine pamoate 25 MG Cap  Commonly known as: VISTARIL  once as needed.           * This list has 2 medication(s) that are the same as other medications prescribed for you. Read the directions carefully, and ask your doctor or other care provider to review them with you.                STOP taking these medications      BAQSIMI 3 mg/actuation Spry  Generic drug: glucagon     cholecalciferol (vitamin D3) 1,250 mcg (50,000 unit) capsule     clindamycin 300 MG capsule  Commonly known as: CLEOCIN     clomiPHENE 50 mg tablet  Commonly known as: CLOMID     insulin glargine 100 units/mL SubQ pen     meloxicam 7.5 MG tablet  Commonly known as: MOBIC     OMEGA 3-6-9 ORAL     ondansetron 4 MG Tbdl  Commonly known as: ZOFRAN-ODT     ondansetron 8 MG tablet  Commonly known as: ZOFRAN              Time spent on the discharge of patient: 35 minutes       Ami Jefrfies MD  Department of Hospital Medicine  [facility] - [address] [place] [address] Telemetry

## 2023-10-03 ENCOUNTER — OFFICE VISIT (OUTPATIENT)
Dept: SURGERY | Facility: CLINIC | Age: 33
End: 2023-10-03
Payer: COMMERCIAL

## 2023-10-03 VITALS
BODY MASS INDEX: 22.19 KG/M2 | SYSTOLIC BLOOD PRESSURE: 119 MMHG | OXYGEN SATURATION: 96 % | HEART RATE: 89 BPM | HEIGHT: 67 IN | TEMPERATURE: 80 F | DIASTOLIC BLOOD PRESSURE: 87 MMHG | RESPIRATION RATE: 20 BRPM | WEIGHT: 141.38 LBS

## 2023-10-03 DIAGNOSIS — K81.1 CHRONIC CHOLECYSTITIS: Primary | ICD-10-CM

## 2023-10-03 PROCEDURE — 99214 OFFICE O/P EST MOD 30 MIN: CPT | Mod: PBBFAC

## 2023-10-03 NOTE — LETTER
October 3, 2023      [facility] - General Surgery Services  [address] [facility] [address]  Phone: [phone]       Patient: Bette Dangelo   YOB: 1990  Date of Visit: 10/03/2023    To Whom It May Concern:    Arash Dangelo  was at [facility] on 10/03/2023. The patient may return to work/school on 10/10 with no restrictions. If symptoms improve prior to this date, she may return to work earlier, but anticipate 10/10 at the earliest. If you have any questions or concerns, or if I can be of further assistance, please do not hesitate to contact me.    Sincerely,    Nicole Azevedo MD

## 2023-10-03 NOTE — PROGRESS NOTES
[facility] GENERAL SURGERY   Clinic Note     HPI:   Bette Dangelo is a 33 y.o. female with PMHx of T1DM, HTN, and bipolar I disorder who presents to clinic for follow up after lap nura on 9/20/2023. She endorses feeling lightheaded and dizzy with sudden movements since surgery. She reports that she started taking Norvasc and metoprolol upon her discharge and that she stopped taking these medications 2 days ago and she still feels lightheaded but it is improved. She is tolerating oral diet well and having bowel movements every 2 days. She has had some loose and hard stools. She endorses reflux and bloating after eating which are improved with Pepcid. She has mild abdominal discomfort that is improving. She denies any drainage, redness, or pain at incision sites, fever, chills.     Review of Systems:  10 point review of systems denied unless otherwise indicated above HPI    Allergies:   Review of patient's allergies indicates:  No Known Allergies    Meds:     Current Outpatient Medications:     amLODIPine (NORVASC) 5 MG tablet, Take 1 tablet (5 mg total) by mouth once daily., Disp: 30 tablet, Rfl: 0    famotidine (PEPCID) 20 MG tablet, Take 1 tablet (20 mg total) by mouth 2 (two) times daily., Disp: 20 tablet, Rfl: 0    FLOVENT  mcg/actuation inhaler, Inhale 2 puffs into the lungs 2 (two) times daily., Disp: , Rfl:     fluticasone propionate (FLONASE) 50 mcg/actuation nasal spray, 1 spray by Each Nostril route once daily., Disp: , Rfl:     insulin aspart U-100 (NOVOLOG) 100 unit/mL injection, Inject 4 Units into the skin 3 (three) times daily., Disp: 3.6 mL, Rfl: 11    insulin detemir U-100 (LEVEMIR) 100 unit/mL injection, Inject 5 Units into the skin once daily., Disp: 10 mL, Rfl: 0    insulin detemir U-100 (LEVEMIR) 100 unit/mL injection, Inject 10 Units into the skin every evening., Disp: 10 mL, Rfl: 1    metoprolol succinate (TOPROL-XL) 25 MG 24 hr tablet, Take 0.5 tablets (12.5 mg total) by mouth once daily.,  Disp: 15 tablet, Rfl: 0    QUEtiapine (SEROQUEL) 50 MG tablet, Take 1 tablet (50 mg total) by mouth 2 (two) times daily., Disp: 60 tablet, Rfl: 0    gabapentin (NEURONTIN) 300 MG capsule, Take 1 capsule (300 mg total) by mouth 3 (three) times daily. (Patient not taking: Reported on 10/3/2023), Disp: 90 capsule, Rfl: 0    hydrOXYzine pamoate (VISTARIL) 25 MG Cap, once as needed., Disp: , Rfl:     PMH:   Past Medical History:   Diagnosis Date    Bipolar disorder     Depression     Diabetes mellitus type I     TMJ (dislocation of temporomandibular joint)     Vitamin D deficiency         Social History:   Social History     Tobacco Use    Smoking status: Never    Smokeless tobacco: Never   Substance Use Topics    Alcohol use: Yes     Alcohol/week: 1.0 standard drink of alcohol     Types: 1 Glasses of wine per week     Comment: Occasionally    Drug use: Never       Family History:   Family History   Problem Relation Age of Onset    Mitral valve prolapse Mother     Other Father         mental concerns    Other Sister         heart issues    No Known Problems Brother     Breast cancer Maternal Grandmother        Surgical History:   Past Surgical History:   Procedure Laterality Date    BACK SURGERY      1999    LAPAROSCOPIC CHOLECYSTECTOMY N/A 9/20/2023    Procedure: CHOLECYSTECTOMY, LAPAROSCOPIC;  Surgeon: Deepak Peña MD;  Location: [facility];  Service: General;  Laterality: N/A;       Objective:  Vitals:  Vitals:    10/03/23 0955   BP: 119/87   Pulse: 89   Resp: 20   Temp: (!) 80 °F (26.7 °C)        Physical Exam:  Gen: NAD  Neuro: awake, alert, answering questions appropriately  CV: RRR  Resp: non-labored breathing  Abd: soft, ND, NT  Ext: moves all 4 spontaneously and purposefully  Skin: warm, well perfused    Pertinent Labs:  - no new pertinent labs    Imaging:  - no new pertinent imaging     Micro/Path/Other:  - Path:  Gallbladder: Received in 10% buffered neutral formalin is a gallbladder measuring 8.5 x 3.4  x 2.3 cm.  The serosa is smooth and shiny.  The lumen contains dark green bile and 2 black polygonal 0.5 cm stone in the neck of the gallbladder.  The wall measures 0.1 mm in thickness.  The mucosa is bile-tinged with the normal reticulated pattern.    Assessment/Plan:  Bette Dangelo is a 33 y.o. female with PMHx of T1DM, HTN, and bipolar I disorder who presents to clinic for follow up after lap nura on 9/20/2023. She is tolerating oral diet well and having bowel movements every 2 days. She endorses feeling lightheaded and dizzy with sudden movements which she attributes to her new blood pressure medications. She denies fever, chills, and redness or drainage from incision sites. Her incisions sites have no signs of infection.     - patient has appt with PCP on 10/10/2023; recommend holding Norvasc and metoprolol until blood pressure can be re-evaluated then  - return to clinic BELKIS Cole, MS3  [facility]  10/03/2023 10:27 AM      Addendum:  Patient seen and examined with medical student, agree with above.   Nicole Azevedo MD  [facility] general surgery PGY5

## 2024-08-23 ENCOUNTER — PATIENT OUTREACH (OUTPATIENT)
Facility: CLINIC | Age: 34
End: 2024-08-23
Payer: COMMERCIAL

## 2024-08-23 NOTE — PROGRESS NOTES
Value base Outreach    Not a patient of Ute Mary MD, pt. was seen by her in Urgent Care, not as PCP.

## 2024-09-10 LAB — HBA1C MFR BLD: 6.5 % (ref 4–6)

## 2024-11-18 ENCOUNTER — PATIENT OUTREACH (OUTPATIENT)
Dept: ADMINISTRATIVE | Facility: HOSPITAL | Age: 34
End: 2024-11-18
Payer: COMMERCIAL

## 2025-08-19 ENCOUNTER — TELEPHONE (OUTPATIENT)
Dept: PRIMARY CARE CLINIC | Facility: CLINIC | Age: 35
End: 2025-08-19
Payer: COMMERCIAL

## 2025-08-26 ENCOUNTER — OFFICE VISIT (OUTPATIENT)
Dept: PRIMARY CARE CLINIC | Facility: CLINIC | Age: 35
End: 2025-08-26
Payer: COMMERCIAL

## 2025-08-26 ENCOUNTER — TELEPHONE (OUTPATIENT)
Dept: PRIMARY CARE CLINIC | Facility: CLINIC | Age: 35
End: 2025-08-26

## 2025-08-26 VITALS
OXYGEN SATURATION: 98 % | BODY MASS INDEX: 27.15 KG/M2 | TEMPERATURE: 98 F | HEIGHT: 67 IN | SYSTOLIC BLOOD PRESSURE: 129 MMHG | HEART RATE: 70 BPM | WEIGHT: 173 LBS | RESPIRATION RATE: 16 BRPM | DIASTOLIC BLOOD PRESSURE: 88 MMHG

## 2025-08-26 DIAGNOSIS — Z11.59 ENCOUNTER FOR HEPATITIS C SCREENING TEST FOR LOW RISK PATIENT: ICD-10-CM

## 2025-08-26 DIAGNOSIS — Z76.89 ENCOUNTER TO ESTABLISH CARE WITH NEW DOCTOR: Primary | ICD-10-CM

## 2025-08-26 DIAGNOSIS — I10 PRIMARY HYPERTENSION: Chronic | ICD-10-CM

## 2025-08-26 DIAGNOSIS — E55.9 VITAMIN D DEFICIENCY: Chronic | ICD-10-CM

## 2025-08-26 DIAGNOSIS — Z11.4 ENCOUNTER FOR SCREENING FOR HIV: ICD-10-CM

## 2025-08-26 DIAGNOSIS — Z00.00 WELLNESS EXAMINATION: Chronic | ICD-10-CM

## 2025-08-26 DIAGNOSIS — F31.9 BIPOLAR 1 DISORDER: Chronic | ICD-10-CM

## 2025-08-26 DIAGNOSIS — E78.2 MIXED HYPERLIPIDEMIA: Chronic | ICD-10-CM

## 2025-08-26 DIAGNOSIS — E10.9 TYPE 1 DIABETES MELLITUS WITHOUT COMPLICATION: Chronic | ICD-10-CM

## 2025-08-26 DIAGNOSIS — F41.1 GAD (GENERALIZED ANXIETY DISORDER): Chronic | ICD-10-CM

## 2025-08-26 PROBLEM — E10.3293: Status: ACTIVE | Noted: 2022-10-03

## 2025-08-26 PROCEDURE — 99395 PREV VISIT EST AGE 18-39: CPT | Mod: ,,, | Performed by: STUDENT IN AN ORGANIZED HEALTH CARE EDUCATION/TRAINING PROGRAM

## 2025-08-26 PROCEDURE — 3074F SYST BP LT 130 MM HG: CPT | Mod: CPTII,,, | Performed by: STUDENT IN AN ORGANIZED HEALTH CARE EDUCATION/TRAINING PROGRAM

## 2025-08-26 PROCEDURE — 1160F RVW MEDS BY RX/DR IN RCRD: CPT | Mod: CPTII,,, | Performed by: STUDENT IN AN ORGANIZED HEALTH CARE EDUCATION/TRAINING PROGRAM

## 2025-08-26 PROCEDURE — 1159F MED LIST DOCD IN RCRD: CPT | Mod: CPTII,,, | Performed by: STUDENT IN AN ORGANIZED HEALTH CARE EDUCATION/TRAINING PROGRAM

## 2025-08-26 PROCEDURE — 3079F DIAST BP 80-89 MM HG: CPT | Mod: CPTII,,, | Performed by: STUDENT IN AN ORGANIZED HEALTH CARE EDUCATION/TRAINING PROGRAM

## 2025-08-26 PROCEDURE — 3008F BODY MASS INDEX DOCD: CPT | Mod: CPTII,,, | Performed by: STUDENT IN AN ORGANIZED HEALTH CARE EDUCATION/TRAINING PROGRAM

## 2025-08-26 PROCEDURE — 3066F NEPHROPATHY DOC TX: CPT | Mod: CPTII,,, | Performed by: STUDENT IN AN ORGANIZED HEALTH CARE EDUCATION/TRAINING PROGRAM

## 2025-08-26 PROCEDURE — 3044F HG A1C LEVEL LT 7.0%: CPT | Mod: CPTII,,, | Performed by: STUDENT IN AN ORGANIZED HEALTH CARE EDUCATION/TRAINING PROGRAM

## 2025-08-26 PROCEDURE — 3061F NEG MICROALBUMINURIA REV: CPT | Mod: CPTII,,, | Performed by: STUDENT IN AN ORGANIZED HEALTH CARE EDUCATION/TRAINING PROGRAM

## 2025-08-26 RX ORDER — SERTRALINE HYDROCHLORIDE 100 MG/1
100 TABLET, FILM COATED ORAL EVERY MORNING
COMMUNITY
Start: 2025-08-06

## 2025-08-26 RX ORDER — INSULIN ASPART 100 [IU]/ML
30 INJECTION, SOLUTION INTRAVENOUS; SUBCUTANEOUS
COMMUNITY
Start: 2025-04-09

## 2025-08-26 RX ORDER — GLUCAGON 3 MG/1
3 POWDER NASAL DAILY PRN
COMMUNITY
Start: 2024-09-25

## 2025-08-26 RX ORDER — BUPROPION HYDROCHLORIDE 150 MG/1
150 TABLET ORAL EVERY MORNING
COMMUNITY
Start: 2025-06-04 | End: 2025-08-26

## 2025-08-26 RX ORDER — GUANFACINE 1 MG/1
1 TABLET, EXTENDED RELEASE ORAL EVERY MORNING
COMMUNITY
Start: 2025-08-06

## 2025-08-26 RX ORDER — ASENAPINE 5 MG/1
TABLET SUBLINGUAL
COMMUNITY
Start: 2025-08-14

## 2025-08-26 RX ORDER — ATOMOXETINE 80 MG/1
80 CAPSULE ORAL EVERY MORNING
COMMUNITY
Start: 2025-06-23 | End: 2025-08-26

## 2025-08-27 ENCOUNTER — TELEPHONE (OUTPATIENT)
Dept: FAMILY MEDICINE | Facility: CLINIC | Age: 35
End: 2025-08-27
Payer: COMMERCIAL

## 2025-08-29 ENCOUNTER — LAB VISIT (OUTPATIENT)
Dept: LAB | Facility: HOSPITAL | Age: 35
End: 2025-08-29
Attending: STUDENT IN AN ORGANIZED HEALTH CARE EDUCATION/TRAINING PROGRAM
Payer: COMMERCIAL

## 2025-08-29 DIAGNOSIS — E55.9 VITAMIN D DEFICIENCY: ICD-10-CM

## 2025-08-29 DIAGNOSIS — Z76.89 ENCOUNTER TO ESTABLISH CARE WITH NEW DOCTOR: ICD-10-CM

## 2025-08-29 DIAGNOSIS — E78.2 MIXED HYPERLIPIDEMIA: ICD-10-CM

## 2025-08-29 DIAGNOSIS — I10 PRIMARY HYPERTENSION: ICD-10-CM

## 2025-08-29 DIAGNOSIS — F41.1 GAD (GENERALIZED ANXIETY DISORDER): ICD-10-CM

## 2025-08-29 DIAGNOSIS — Z00.00 WELLNESS EXAMINATION: ICD-10-CM

## 2025-08-29 DIAGNOSIS — F31.9 BIPOLAR 1 DISORDER: ICD-10-CM

## 2025-08-29 DIAGNOSIS — Z11.59 ENCOUNTER FOR HEPATITIS C SCREENING TEST FOR LOW RISK PATIENT: ICD-10-CM

## 2025-08-29 DIAGNOSIS — Z11.4 ENCOUNTER FOR SCREENING FOR HIV: ICD-10-CM

## 2025-08-29 DIAGNOSIS — E10.9 TYPE 1 DIABETES MELLITUS WITHOUT COMPLICATION: ICD-10-CM

## 2025-08-29 LAB
25(OH)D3+25(OH)D2 SERPL-MCNC: 31 NG/ML (ref 30–80)
ALBUMIN SERPL-MCNC: 4.2 G/DL (ref 3.5–5)
ALBUMIN/GLOB SERPL: 1.2 RATIO (ref 1.1–2)
ALP SERPL-CCNC: 62 UNIT/L (ref 40–150)
ALT SERPL-CCNC: 14 UNIT/L (ref 0–55)
ANION GAP SERPL CALC-SCNC: 8 MEQ/L
AST SERPL-CCNC: 15 UNIT/L (ref 11–45)
BACTERIA #/AREA URNS AUTO: ABNORMAL /HPF
BASOPHILS # BLD AUTO: 0.02 X10(3)/MCL
BASOPHILS NFR BLD AUTO: 0.4 %
BILIRUB SERPL-MCNC: 0.6 MG/DL
BILIRUB UR QL STRIP.AUTO: NEGATIVE
BUN SERPL-MCNC: 10 MG/DL (ref 7–18.7)
CALCIUM SERPL-MCNC: 9.4 MG/DL (ref 8.4–10.2)
CHLORIDE SERPL-SCNC: 107 MMOL/L (ref 98–107)
CHOLEST SERPL-MCNC: 164 MG/DL
CHOLEST/HDLC SERPL: 3 {RATIO} (ref 0–5)
CLARITY UR: CLEAR
CO2 SERPL-SCNC: 24 MMOL/L (ref 22–29)
COLOR UR AUTO: ABNORMAL
CREAT SERPL-MCNC: 0.75 MG/DL (ref 0.55–1.02)
CREAT/UREA NIT SERPL: 13
EOSINOPHIL # BLD AUTO: 0.06 X10(3)/MCL (ref 0–0.9)
EOSINOPHIL NFR BLD AUTO: 1.2 %
ERYTHROCYTE [DISTWIDTH] IN BLOOD BY AUTOMATED COUNT: 11.9 % (ref 11.5–17)
EST. AVERAGE GLUCOSE BLD GHB EST-MCNC: 148.5 MG/DL
GFR SERPLBLD CREATININE-BSD FMLA CKD-EPI: >60 ML/MIN/1.73/M2
GLOBULIN SER-MCNC: 3.4 GM/DL (ref 2.4–3.5)
GLUCOSE SERPL-MCNC: 137 MG/DL (ref 74–100)
GLUCOSE UR QL STRIP: NORMAL
HBA1C MFR BLD: 6.8 %
HCT VFR BLD AUTO: 41.7 % (ref 37–47)
HDLC SERPL-MCNC: 53 MG/DL (ref 35–60)
HGB BLD-MCNC: 13.5 G/DL (ref 12–16)
HGB UR QL STRIP: NEGATIVE
IMM GRANULOCYTES # BLD AUTO: 0.01 X10(3)/MCL (ref 0–0.04)
IMM GRANULOCYTES NFR BLD AUTO: 0.2 %
KETONES UR QL STRIP: NEGATIVE
LDLC SERPL CALC-MCNC: 98 MG/DL (ref 50–140)
LEUKOCYTE ESTERASE UR QL STRIP: NEGATIVE
LYMPHOCYTES # BLD AUTO: 2.05 X10(3)/MCL (ref 0.6–4.6)
LYMPHOCYTES NFR BLD AUTO: 40.9 %
MCH RBC QN AUTO: 28.8 PG (ref 27–31)
MCHC RBC AUTO-ENTMCNC: 32.4 G/DL (ref 33–36)
MCV RBC AUTO: 88.9 FL (ref 80–94)
MONOCYTES # BLD AUTO: 0.39 X10(3)/MCL (ref 0.1–1.3)
MONOCYTES NFR BLD AUTO: 7.8 %
MUCOUS THREADS URNS QL MICRO: ABNORMAL /LPF
NEUTROPHILS # BLD AUTO: 2.48 X10(3)/MCL (ref 2.1–9.2)
NEUTROPHILS NFR BLD AUTO: 49.5 %
NITRITE UR QL STRIP: NEGATIVE
NRBC BLD AUTO-RTO: 0 %
PH UR STRIP: 6 [PH]
PLATELET # BLD AUTO: 312 X10(3)/MCL (ref 130–400)
PMV BLD AUTO: 9.7 FL (ref 7.4–10.4)
POTASSIUM SERPL-SCNC: 4.4 MMOL/L (ref 3.5–5.1)
PROT SERPL-MCNC: 7.6 GM/DL (ref 6.4–8.3)
PROT UR QL STRIP: NEGATIVE
RBC # BLD AUTO: 4.69 X10(6)/MCL (ref 4.2–5.4)
RBC #/AREA URNS AUTO: ABNORMAL /HPF
SODIUM SERPL-SCNC: 139 MMOL/L (ref 136–145)
SP GR UR STRIP.AUTO: 1.02 (ref 1–1.03)
SQUAMOUS #/AREA URNS LPF: ABNORMAL /HPF
TRIGL SERPL-MCNC: 65 MG/DL (ref 37–140)
TSH SERPL-ACNC: 2.42 UIU/ML (ref 0.35–4.94)
UROBILINOGEN UR STRIP-ACNC: NORMAL
VLDLC SERPL CALC-MCNC: 13 MG/DL
WBC # BLD AUTO: 5.01 X10(3)/MCL (ref 4.5–11.5)
WBC #/AREA URNS AUTO: ABNORMAL /HPF

## 2025-08-29 PROCEDURE — 84443 ASSAY THYROID STIM HORMONE: CPT

## 2025-08-29 PROCEDURE — 85025 COMPLETE CBC W/AUTO DIFF WBC: CPT

## 2025-08-29 PROCEDURE — 80061 LIPID PANEL: CPT

## 2025-08-29 PROCEDURE — 87389 HIV-1 AG W/HIV-1&-2 AB AG IA: CPT

## 2025-08-29 PROCEDURE — 83036 HEMOGLOBIN GLYCOSYLATED A1C: CPT

## 2025-08-29 PROCEDURE — 36415 COLL VENOUS BLD VENIPUNCTURE: CPT

## 2025-08-29 PROCEDURE — 82306 VITAMIN D 25 HYDROXY: CPT

## 2025-08-29 PROCEDURE — 86803 HEPATITIS C AB TEST: CPT

## 2025-08-29 PROCEDURE — 81001 URINALYSIS AUTO W/SCOPE: CPT

## 2025-08-29 PROCEDURE — 80053 COMPREHEN METABOLIC PANEL: CPT

## 2025-08-30 LAB
HCV AB SERPL QL IA: NONREACTIVE
HIV 1+2 AB+HIV1 P24 AG SERPL QL IA: NONREACTIVE

## (undated) DEVICE — CANNULA ENDOPATH XCEL 5X100MM

## (undated) DEVICE — TROCAR ENDOPATH XCEL 12X100MM

## (undated) DEVICE — SUT MCRYL PLUS 4-0 PS2 27IN

## (undated) DEVICE — NDL GRANEE OPEN LOOP GRASPER

## (undated) DEVICE — KIT LAPAROSCOPY UNIVERSITY

## (undated) DEVICE — APPLICATOR CHLORAPREP ORN 26ML

## (undated) DEVICE — MANIFOLD 4 PORT

## (undated) DEVICE — GLOVE SENSICARE PI GRN 7.5

## (undated) DEVICE — GLOVE SIGNATURE ESSNTL LTX 6.5

## (undated) DEVICE — GLOVE PROTEXIS NEOPRENE 7.5

## (undated) DEVICE — GLOVE SENSICARE PI GRN 6

## (undated) DEVICE — GLOVE SIGNATURE MICRO LTX 7.5

## (undated) DEVICE — BAG TISS RETRV MONARCH 10MM

## (undated) DEVICE — GLOVE SENSICARE PI GRN 6.5

## (undated) DEVICE — NDL PNEUMO INSUFFLATI 120MM

## (undated) DEVICE — TOWEL OR DISP STRL BLUE 4/PK

## (undated) DEVICE — SYR 10CC LUER LOCK

## (undated) DEVICE — TROCAR ENDOPATH XCEL 5X100MM

## (undated) DEVICE — KIT SURGICAL TURNOVER

## (undated) DEVICE — TUBING INSUFFLATION W/LUER LOK

## (undated) DEVICE — SUT 0 VICRYL / UR6 (J603)

## (undated) DEVICE — APPLIER CLIP ENDO MED/LG 10MM

## (undated) DEVICE — ADHESIVE DERMABOND ADVANCED

## (undated) DEVICE — HANDLE DEVON RIGID OR LIGHT

## (undated) DEVICE — SOL IRRI STRL WATER 1000ML

## (undated) DEVICE — NDL HYPO REG 25G X 1 1/2

## (undated) DEVICE — GOWN POLY REINF BRTH SLV XL